# Patient Record
Sex: FEMALE | Race: WHITE | Employment: FULL TIME | URBAN - METROPOLITAN AREA
[De-identification: names, ages, dates, MRNs, and addresses within clinical notes are randomized per-mention and may not be internally consistent; named-entity substitution may affect disease eponyms.]

---

## 2017-04-14 ENCOUNTER — TELEPHONE (OUTPATIENT)
Dept: FAMILY MEDICINE CLINIC | Facility: CLINIC | Age: 67
End: 2017-04-14

## 2017-04-14 NOTE — TELEPHONE ENCOUNTER
LM to call back to complete the Annual Health Assessment form prior to her appt.  On 4/20/17 at 11:00 am with Dr. Rosaura Ortiz

## 2017-04-20 ENCOUNTER — OFFICE VISIT (OUTPATIENT)
Dept: FAMILY MEDICINE CLINIC | Facility: CLINIC | Age: 67
End: 2017-04-20

## 2017-04-20 DIAGNOSIS — Z12.31 VISIT FOR SCREENING MAMMOGRAM: ICD-10-CM

## 2017-04-20 DIAGNOSIS — Z00.00 ENCOUNTER FOR ANNUAL HEALTH EXAMINATION: Primary | ICD-10-CM

## 2017-04-20 DIAGNOSIS — M54.50 CHRONIC BILATERAL LOW BACK PAIN WITHOUT SCIATICA: ICD-10-CM

## 2017-04-20 DIAGNOSIS — B07.0 PLANTAR WART: ICD-10-CM

## 2017-04-20 DIAGNOSIS — G89.29 CHRONIC BILATERAL LOW BACK PAIN WITHOUT SCIATICA: ICD-10-CM

## 2017-04-20 DIAGNOSIS — Z12.11 SCREENING FOR COLON CANCER: ICD-10-CM

## 2017-04-20 DIAGNOSIS — Z78.0 POSTMENOPAUSAL: ICD-10-CM

## 2017-04-20 DIAGNOSIS — B35.1 NAIL FUNGAL INFECTION: ICD-10-CM

## 2017-04-20 DIAGNOSIS — Z13.220 SCREENING FOR LIPOID DISORDERS: ICD-10-CM

## 2017-04-20 DIAGNOSIS — Z13.0 SCREENING FOR DEFICIENCY ANEMIA: ICD-10-CM

## 2017-04-20 DIAGNOSIS — I10 ESSENTIAL HYPERTENSION: ICD-10-CM

## 2017-04-20 DIAGNOSIS — Z13.29 SCREENING FOR THYROID DISORDER: ICD-10-CM

## 2017-04-20 DIAGNOSIS — R42 VERTIGO: ICD-10-CM

## 2017-04-20 DIAGNOSIS — Z00.00 LABORATORY EXAM ORDERED AS PART OF ROUTINE GENERAL MEDICAL EXAMINATION: ICD-10-CM

## 2017-04-20 PROCEDURE — 99203 OFFICE O/P NEW LOW 30 MIN: CPT | Performed by: FAMILY MEDICINE

## 2017-04-20 PROCEDURE — G0438 PPPS, INITIAL VISIT: HCPCS | Performed by: FAMILY MEDICINE

## 2017-04-20 RX ORDER — MULTIVIT,IRON,MINERALS/LUTEIN
1 TABLET ORAL DAILY
COMMUNITY

## 2017-04-20 RX ORDER — MULTIVIT WITH MINERALS/LUTEIN
250 TABLET ORAL DAILY
COMMUNITY

## 2017-04-20 RX ORDER — CALCIUM CARBONATE/VITAMIN D3 600 MG-10
1 TABLET ORAL DAILY
COMMUNITY

## 2017-04-20 RX ORDER — LISINOPRIL 10 MG/1
10 TABLET ORAL
Refills: 3 | COMMUNITY
Start: 2017-04-07 | End: 2017-05-18 | Stop reason: DRUGHIGH

## 2017-04-20 NOTE — PATIENT INSTRUCTIONS
Taty Alcaraz's SCREENING SCHEDULE   Tests on this list are recommended by your physician but may not be covered, or covered at this frequency, by your insurer. Please check with your insurance carrier before scheduling to verify coverage.    PREVENTATI Covered every 10 years- more often if abnormal Colonoscopy,10 Years due on 08/04/1998 Update Health Maintenance if applicable    Flex Sigmoidoscopy Screen  Covered every 5 years No results found for this or any previous visit. No flowsheet data found. (Pneumovax)  Covered Once after 65 No orders found for this or any previous visit. Please get once after your 65th birthday    Hepatitis B for Moderate/High Risk       No orders found for this or any previous visit.  Medium/high risk factors:   End-stage re

## 2017-04-20 NOTE — PROGRESS NOTES
HPI:   Marito Coelho is a 74year old female who presents for a Medicare Initial Annual Wellness visit (Once after 12 month Medicare anniversery)  also needs med reviewed/refill and forms completed to have work restrictions.      LOV 5/2012    Hx of chr Lab Results  Component Value Date   AST 16 11/03/2011   ALT 13 11/03/2011   CA 9.5 11/03/2011   ALB 4.2 11/03/2011   TSH 0.732 12/16/2008   CREATSERUM 0.73 11/03/2011   * 11/03/2011        CBC  (most recent labs)     Lab Results  Component Value Hypertension in her mother; acute MI in her father; hodgkin's disease in her father. There is no history of Heart Disorder, Lipids, Obesity, or Psychiatric. SOCIAL HISTORY:   She  reports that she has never smoked.  She has never used smokeless tobacco. S normal, no murmur, click, rub or gallop, regular rate and rhythm  Extremities: extremities normal, atraumatic, no cyanosis or edema    SUGGESTED VACCINATIONS - Influenza, Pneumococcal, Zoster, Tetanus     Immunization History   Administered Date(s) Adminis does not have a Living Will on file in [address] [place] [address] Rd. Discussed with patient and provided information      [address] [place] on file in Epic:    Hugo Goddard does not have a Power of  for [employer] on file in [address] [place] [address] Rd.  Discussed with patient and any tripping hazards?: 0-No    Are you on multiple medications?: 0-No    Does pain affect your day to day activities?: 1-Yes     Have you had any memory issues?: 0-No    Fall/Risk Scoring: 3    Scoring Interpretation: 0 - 3 No Risk     Depression Screening Screening      Ophthalmology Visit Annually: Diabetics, FHx Glaucoma, AA>50, > 65 No flowsheet data found. Bone Density Screening      Dexascan Every two years No results found for this or any previous visit. No flowsheet data found.     Pap and Potassium  Annually POTASSIUM (mmol/L)   Date Value   11/03/2011 5.3   12/16/2008 4.5    No flowsheet data found.     Creatinine  Annually CREATININE, SERUM (mg/dL)   Date Value   12/16/2008 0.70     CREATININE (mg/dL)   Date Value   11/03/2011 0.73    No f

## 2017-04-22 VITALS
HEIGHT: 64.8 IN | TEMPERATURE: 98 F | DIASTOLIC BLOOD PRESSURE: 88 MMHG | HEART RATE: 80 BPM | SYSTOLIC BLOOD PRESSURE: 138 MMHG | RESPIRATION RATE: 16 BRPM | WEIGHT: 189.38 LBS | BODY MASS INDEX: 31.55 KG/M2

## 2017-04-22 PROBLEM — R42 VERTIGO: Status: ACTIVE | Noted: 2017-04-22

## 2017-04-22 PROBLEM — I10 ESSENTIAL HYPERTENSION: Status: ACTIVE | Noted: 2017-04-22

## 2017-04-22 PROBLEM — G89.29 CHRONIC BILATERAL LOW BACK PAIN WITHOUT SCIATICA: Status: ACTIVE | Noted: 2017-04-22

## 2017-04-22 PROBLEM — M54.50 CHRONIC BILATERAL LOW BACK PAIN WITHOUT SCIATICA: Status: ACTIVE | Noted: 2017-04-22

## 2017-04-27 ENCOUNTER — LAB ENCOUNTER (OUTPATIENT)
Dept: LAB | Age: 67
End: 2017-04-27
Attending: FAMILY MEDICINE
Payer: MEDICARE

## 2017-04-27 DIAGNOSIS — Z13.0 SCREENING FOR DEFICIENCY ANEMIA: ICD-10-CM

## 2017-04-27 DIAGNOSIS — Z13.29 SCREENING FOR THYROID DISORDER: ICD-10-CM

## 2017-04-27 DIAGNOSIS — Z00.00 LABORATORY EXAM ORDERED AS PART OF ROUTINE GENERAL MEDICAL EXAMINATION: ICD-10-CM

## 2017-04-27 DIAGNOSIS — Z13.220 SCREENING FOR LIPOID DISORDERS: ICD-10-CM

## 2017-04-27 PROCEDURE — 84443 ASSAY THYROID STIM HORMONE: CPT

## 2017-04-27 PROCEDURE — 84439 ASSAY OF FREE THYROXINE: CPT

## 2017-04-27 PROCEDURE — 80061 LIPID PANEL: CPT

## 2017-04-27 PROCEDURE — 85025 COMPLETE CBC W/AUTO DIFF WBC: CPT

## 2017-04-27 PROCEDURE — 36415 COLL VENOUS BLD VENIPUNCTURE: CPT

## 2017-04-27 PROCEDURE — 80053 COMPREHEN METABOLIC PANEL: CPT

## 2017-05-11 ENCOUNTER — OFFICE VISIT (OUTPATIENT)
Dept: FAMILY MEDICINE CLINIC | Facility: CLINIC | Age: 67
End: 2017-05-11

## 2017-05-11 ENCOUNTER — APPOINTMENT (OUTPATIENT)
Dept: LAB | Age: 67
End: 2017-05-11
Attending: FAMILY MEDICINE
Payer: MEDICARE

## 2017-05-11 VITALS
SYSTOLIC BLOOD PRESSURE: 156 MMHG | TEMPERATURE: 99 F | DIASTOLIC BLOOD PRESSURE: 106 MMHG | BODY MASS INDEX: 32 KG/M2 | RESPIRATION RATE: 16 BRPM | WEIGHT: 191 LBS | HEART RATE: 84 BPM

## 2017-05-11 DIAGNOSIS — R53.83 FATIGUE, UNSPECIFIED TYPE: ICD-10-CM

## 2017-05-11 DIAGNOSIS — I10 ESSENTIAL HYPERTENSION: Primary | ICD-10-CM

## 2017-05-11 DIAGNOSIS — R60.0 PEDAL EDEMA: ICD-10-CM

## 2017-05-11 PROCEDURE — 36415 COLL VENOUS BLD VENIPUNCTURE: CPT

## 2017-05-11 PROCEDURE — 82746 ASSAY OF FOLIC ACID SERUM: CPT

## 2017-05-11 PROCEDURE — 82607 VITAMIN B-12: CPT

## 2017-05-11 PROCEDURE — 99214 OFFICE O/P EST MOD 30 MIN: CPT | Performed by: FAMILY MEDICINE

## 2017-05-11 PROCEDURE — 82306 VITAMIN D 25 HYDROXY: CPT

## 2017-05-11 RX ORDER — LISINOPRIL AND HYDROCHLOROTHIAZIDE 20; 12.5 MG/1; MG/1
1 TABLET ORAL DAILY
Qty: 30 TABLET | Refills: 5 | Status: SHIPPED | OUTPATIENT
Start: 2017-05-11 | End: 2017-10-22

## 2017-05-11 NOTE — PROGRESS NOTES
Amee Hunter is a 74year old female. HPI:   Has been checking BP at home - 150/90. Reviewed labs, has elevated mcv. Otherwise normal.   C/o fatigue. Has low pedal edema that is chronic.     Patient Active Problem List:     Chronic bilateral l

## 2017-05-13 NOTE — PROGRESS NOTES
Quick Note:    Labs reviewed, will discuss at upcoming office visit. Elevated folic acid and Y05 - will check on supplements.   Vit D acceptable.  ______

## 2017-05-18 ENCOUNTER — OFFICE VISIT (OUTPATIENT)
Dept: FAMILY MEDICINE CLINIC | Facility: CLINIC | Age: 67
End: 2017-05-18

## 2017-05-18 VITALS
HEIGHT: 65.5 IN | BODY MASS INDEX: 31.44 KG/M2 | DIASTOLIC BLOOD PRESSURE: 88 MMHG | HEART RATE: 84 BPM | TEMPERATURE: 99 F | WEIGHT: 191 LBS | RESPIRATION RATE: 16 BRPM | SYSTOLIC BLOOD PRESSURE: 136 MMHG

## 2017-05-18 DIAGNOSIS — I10 ESSENTIAL HYPERTENSION: Primary | ICD-10-CM

## 2017-05-18 DIAGNOSIS — R53.83 FATIGUE, UNSPECIFIED TYPE: ICD-10-CM

## 2017-05-18 PROCEDURE — 99213 OFFICE O/P EST LOW 20 MIN: CPT | Performed by: FAMILY MEDICINE

## 2017-05-18 NOTE — PROGRESS NOTES
Nancy Camacho is a 74year old female. HPI:   Med changed to lisinopril-HCTZ 1 week ago. BP better. Home readings 130/80s. Had labs done 2/2 fatigue. S90 high/normal  Folic acid high. Vit D 40.    Pt not exactly sure which supplements she is

## 2017-07-07 ENCOUNTER — APPOINTMENT (OUTPATIENT)
Dept: GENERAL RADIOLOGY | Facility: HOSPITAL | Age: 67
End: 2017-07-07
Attending: EMERGENCY MEDICINE
Payer: MEDICARE

## 2017-07-07 ENCOUNTER — APPOINTMENT (OUTPATIENT)
Dept: ULTRASOUND IMAGING | Facility: HOSPITAL | Age: 67
End: 2017-07-07
Attending: EMERGENCY MEDICINE
Payer: MEDICARE

## 2017-07-07 ENCOUNTER — HOSPITAL ENCOUNTER (EMERGENCY)
Facility: HOSPITAL | Age: 67
Discharge: HOME | End: 2017-07-07
Attending: EMERGENCY MEDICINE
Payer: MEDICARE

## 2017-07-07 VITALS
SYSTOLIC BLOOD PRESSURE: 147 MMHG | TEMPERATURE: 98 F | OXYGEN SATURATION: 95 % | BODY MASS INDEX: 30.69 KG/M2 | HEIGHT: 66 IN | WEIGHT: 190.94 LBS | DIASTOLIC BLOOD PRESSURE: 75 MMHG | HEART RATE: 63 BPM | RESPIRATION RATE: 17 BRPM

## 2017-07-07 DIAGNOSIS — M79.672 PAIN OF LEFT HEEL: Primary | ICD-10-CM

## 2017-07-07 DIAGNOSIS — R60.0 LEG EDEMA, LEFT: ICD-10-CM

## 2017-07-07 PROCEDURE — 99284 EMERGENCY DEPT VISIT MOD MDM: CPT

## 2017-07-07 PROCEDURE — 73630 X-RAY EXAM OF FOOT: CPT | Performed by: EMERGENCY MEDICINE

## 2017-07-07 PROCEDURE — 93971 EXTREMITY STUDY: CPT | Performed by: EMERGENCY MEDICINE

## 2017-07-07 NOTE — ED PROVIDER NOTES
Patient Seen in: [facility] Emergency Department    History   Patient presents with:  Heel Pain    Stated Complaint: heal pain    HPI    This is a very pleasant 69-year-old female who presents with left foot and heel pain with foot swelling.   Amrit arthro    Medications :   Lisinopril-Hydrochlorothiazide 20-12.5 MG Oral Tab,  Take 1 tablet by mouth daily. Multiple Vitamins-Minerals (CENTRUM SILVER ULTRA WOMENS) Oral Tab,  Take 1 tablet by mouth daily.    vitamin A 58312 UNITS Oral Cap,  Take 10,000 98.2 °F (36.8 °C) (Oral)   Resp 17   Ht 167.6 cm (5' 6\")   Wt 86.6 kg   SpO2 95%   BMI 30.81 kg/m²         Physical Exam    GENERAL: Awake, alert oriented x3, nontoxic appearing. SKIN: Normal, warm, and dry.   HEENT:  Pupils equally round and reactive to The following veins were imaged:  Common, deep, and superficial femoral, popliteal, sapheno-femoral junction, posterior tibial veins, and the contralateral common femoral vein.   FINDINGS:  EXTREMITY EXAMINED:  Left lower extremity SAPHENOFEMORAL JUNCTION:

## 2017-07-07 NOTE — ED INITIAL ASSESSMENT (HPI)
Patient presents with complaints of pain and swelling in the left foot. This first started a while back when she noticed some heel pain. Since it has progressed to swelling and some numbness on the lateral aspect of her foot.  No known injury, but she spend

## 2017-07-10 ENCOUNTER — OFFICE VISIT (OUTPATIENT)
Dept: FAMILY MEDICINE CLINIC | Facility: CLINIC | Age: 67
End: 2017-07-10

## 2017-07-10 VITALS
RESPIRATION RATE: 18 BRPM | WEIGHT: 190.63 LBS | SYSTOLIC BLOOD PRESSURE: 130 MMHG | DIASTOLIC BLOOD PRESSURE: 78 MMHG | HEIGHT: 64.5 IN | HEART RATE: 84 BPM | BODY MASS INDEX: 32.15 KG/M2

## 2017-07-10 DIAGNOSIS — M79.89 FOOT SWELLING: Primary | ICD-10-CM

## 2017-07-10 DIAGNOSIS — M79.671 RIGHT FOOT PAIN: ICD-10-CM

## 2017-07-10 PROCEDURE — 99213 OFFICE O/P EST LOW 20 MIN: CPT | Performed by: FAMILY MEDICINE

## 2017-07-10 NOTE — PROGRESS NOTES
Lainey Santos is a 74year old female. S:  Patient presents today with the following concerns:  · Pain in left heel since April or May, then traveled to big toe and now pain is on the top of the foot. Hurts to walk and put weight on the foot.   Chris Cancer Daughter      Acute Lymphcytic Leukemia   • Cancer Brother      Colon   • Heart Disorder Neg    • Lipids Neg    • Obesity Neg    • Psychiatric Neg    • acute MI [Other] [OTHER] Father    • hodgkin's disease [Other] [OTHER] Father        REVIEW OF SY

## 2017-07-18 PROBLEM — M84.375A STRESS REACTION OF LEFT FOOT, INITIAL ENCOUNTER: Status: ACTIVE | Noted: 2017-07-18

## 2017-07-28 PROBLEM — M84.375D STRESS FRACTURE OF LEFT FOOT WITH ROUTINE HEALING, SUBSEQUENT ENCOUNTER: Status: ACTIVE | Noted: 2017-07-28

## 2017-10-23 RX ORDER — LISINOPRIL AND HYDROCHLOROTHIAZIDE 20; 12.5 MG/1; MG/1
1 TABLET ORAL DAILY
Qty: 30 TABLET | Refills: 5 | Status: SHIPPED | OUTPATIENT
Start: 2017-10-23 | End: 2017-11-09

## 2017-11-09 ENCOUNTER — OFFICE VISIT (OUTPATIENT)
Dept: FAMILY MEDICINE CLINIC | Facility: CLINIC | Age: 67
End: 2017-11-09

## 2017-11-09 VITALS
BODY MASS INDEX: 30.53 KG/M2 | HEART RATE: 72 BPM | WEIGHT: 190 LBS | SYSTOLIC BLOOD PRESSURE: 118 MMHG | TEMPERATURE: 98 F | RESPIRATION RATE: 16 BRPM | HEIGHT: 66 IN | DIASTOLIC BLOOD PRESSURE: 60 MMHG

## 2017-11-09 DIAGNOSIS — I10 ESSENTIAL HYPERTENSION: Primary | ICD-10-CM

## 2017-11-09 DIAGNOSIS — R42 VERTIGO: ICD-10-CM

## 2017-11-09 DIAGNOSIS — E78.00 PURE HYPERCHOLESTEROLEMIA: ICD-10-CM

## 2017-11-09 PROCEDURE — 99214 OFFICE O/P EST MOD 30 MIN: CPT | Performed by: FAMILY MEDICINE

## 2017-11-09 RX ORDER — INFLUENZA A VIRUSA/MICHIGAN/45/2015 X-275 (H1N1) ANTIGEN (FORMALDEHYDE INACTIVATED), INFLUENZA A VIRUS A/HONG KONG/4801/2014 X-263B (H3N2) ANTIGEN (FORMALDEHYDE INACTIVATED), AND INFLUENZA B VIRUS B/BRISBANE/60/2008 ANTIGEN (FORMALDEHYDE INACTIVATED) 60; 60; 60 UG/.5ML; UG/.5ML; UG/.5ML
INJECTION, SUSPENSION INTRAMUSCULAR
Refills: 0 | COMMUNITY
Start: 2017-10-06 | End: 2017-11-09

## 2017-11-09 RX ORDER — LISINOPRIL AND HYDROCHLOROTHIAZIDE 20; 12.5 MG/1; MG/1
1 TABLET ORAL DAILY
Qty: 90 TABLET | Refills: 1 | Status: SHIPPED | OUTPATIENT
Start: 2017-11-09 | End: 2018-05-17

## 2017-11-09 NOTE — PROGRESS NOTES
Logan Alexandre is a 69year old female. HPI:   Patient has a history of hypertension which is currently treated with lisinopril hydrochlorothiazide 20–12 0.5 mg daily. Blood pressure is well controlled at this time. She has no side effects.     She h Future    Follow-up in 6 months for annual wellness visit. Have labs done prior to visit. No orders of the defined types were placed in this encounter.     Meds & Refills for this Visit:    No prescriptions requested or ordered in this encounter     Arianne Brice

## 2018-03-02 ENCOUNTER — OFFICE VISIT (OUTPATIENT)
Dept: FAMILY MEDICINE CLINIC | Facility: CLINIC | Age: 68
End: 2018-03-02

## 2018-03-02 VITALS
SYSTOLIC BLOOD PRESSURE: 122 MMHG | HEIGHT: 66 IN | TEMPERATURE: 98 F | WEIGHT: 189 LBS | RESPIRATION RATE: 16 BRPM | HEART RATE: 84 BPM | BODY MASS INDEX: 30.37 KG/M2 | DIASTOLIC BLOOD PRESSURE: 70 MMHG

## 2018-03-02 DIAGNOSIS — R04.0 EPISTAXIS: ICD-10-CM

## 2018-03-02 DIAGNOSIS — J01.40 ACUTE NON-RECURRENT PANSINUSITIS: Primary | ICD-10-CM

## 2018-03-02 PROCEDURE — 99213 OFFICE O/P EST LOW 20 MIN: CPT | Performed by: FAMILY MEDICINE

## 2018-03-02 RX ORDER — AMOXICILLIN 875 MG/1
875 TABLET, COATED ORAL 2 TIMES DAILY
Qty: 20 TABLET | Refills: 0 | Status: SHIPPED | OUTPATIENT
Start: 2018-03-02 | End: 2018-05-17

## 2018-03-02 NOTE — PROGRESS NOTES
HPI:   Fco Randall is a 69year old female who presents for upper respiratory symptoms. URI sx began about 11 days ago. 1 week ago, had large nose bleed that lasted 40 min, had clots. Had a couple more, but then improved last week.   Then had sev tenderness  NECK: supple,no adenopathy  LUNGS: clear to auscultation  CARDIO: RRR without murmur    ASSESSMENT AND PLAN:   Logan Alexandre is a 69year old female.   Acute non-recurrent pansinusitis  (primary encounter diagnosis)  Epistaxis  · abx as direct

## 2018-04-20 ENCOUNTER — TELEPHONE (OUTPATIENT)
Dept: FAMILY MEDICINE CLINIC | Facility: CLINIC | Age: 68
End: 2018-04-20

## 2018-05-11 ENCOUNTER — TELEPHONE (OUTPATIENT)
Dept: FAMILY MEDICINE CLINIC | Facility: CLINIC | Age: 68
End: 2018-05-11

## 2018-05-11 NOTE — TELEPHONE ENCOUNTER
Called patient and left message asking patient to contact the office to complete annual health assessment prior to appointment 05/17/18

## 2018-05-17 ENCOUNTER — OFFICE VISIT (OUTPATIENT)
Dept: FAMILY MEDICINE CLINIC | Facility: CLINIC | Age: 68
End: 2018-05-17

## 2018-05-17 ENCOUNTER — LAB ENCOUNTER (OUTPATIENT)
Dept: LAB | Age: 68
End: 2018-05-17
Attending: FAMILY MEDICINE
Payer: MEDICARE

## 2018-05-17 VITALS
SYSTOLIC BLOOD PRESSURE: 120 MMHG | HEART RATE: 84 BPM | RESPIRATION RATE: 16 BRPM | TEMPERATURE: 98 F | DIASTOLIC BLOOD PRESSURE: 80 MMHG | WEIGHT: 190 LBS | HEIGHT: 64 IN | BODY MASS INDEX: 32.44 KG/M2

## 2018-05-17 DIAGNOSIS — Z11.59 NEED FOR HEPATITIS C SCREENING TEST: ICD-10-CM

## 2018-05-17 DIAGNOSIS — E78.00 PURE HYPERCHOLESTEROLEMIA: ICD-10-CM

## 2018-05-17 DIAGNOSIS — Z12.31 VISIT FOR SCREENING MAMMOGRAM: ICD-10-CM

## 2018-05-17 DIAGNOSIS — Z00.00 ENCOUNTER FOR ANNUAL HEALTH EXAMINATION: Primary | ICD-10-CM

## 2018-05-17 DIAGNOSIS — Z23 NEED FOR VACCINATION: ICD-10-CM

## 2018-05-17 DIAGNOSIS — Z78.0 POSTMENOPAUSAL ESTROGEN DEFICIENCY: ICD-10-CM

## 2018-05-17 DIAGNOSIS — I10 ESSENTIAL HYPERTENSION: ICD-10-CM

## 2018-05-17 DIAGNOSIS — Z12.11 COLON CANCER SCREENING: ICD-10-CM

## 2018-05-17 PROCEDURE — G0009 ADMIN PNEUMOCOCCAL VACCINE: HCPCS | Performed by: FAMILY MEDICINE

## 2018-05-17 PROCEDURE — G0439 PPPS, SUBSEQ VISIT: HCPCS | Performed by: FAMILY MEDICINE

## 2018-05-17 PROCEDURE — 80061 LIPID PANEL: CPT

## 2018-05-17 PROCEDURE — 80053 COMPREHEN METABOLIC PANEL: CPT

## 2018-05-17 PROCEDURE — 90670 PCV13 VACCINE IM: CPT | Performed by: FAMILY MEDICINE

## 2018-05-17 PROCEDURE — 86803 HEPATITIS C AB TEST: CPT

## 2018-05-17 RX ORDER — LISINOPRIL AND HYDROCHLOROTHIAZIDE 20; 12.5 MG/1; MG/1
1 TABLET ORAL DAILY
Qty: 90 TABLET | Refills: 3 | Status: SHIPPED | OUTPATIENT
Start: 2018-05-17 | End: 2019-06-04

## 2018-05-17 NOTE — PATIENT INSTRUCTIONS
Janak Alcaraz's SCREENING SCHEDULE   Tests on this list are recommended by your physician but may not be covered, or covered at this frequency, by your insurer. Please check with your insurance carrier before scheduling to verify coverage.    PREVENTATI patients who meet one of the following criteria:   • Men who are 73-68 years old and have smoked more than 100 cigarettes in their lifetime   • Anyone with a family history    Colorectal Cancer Screening  Covered up to Age 76     Colonoscopy Screen   Cover Annually No orders found for this or any previous visit.  Please get every year    Pneumococcal 13 (Prevnar)  Covered Once after 65   Orders placed or performed in visit on 05/17/18  -PNEUMOCOCCAL VACC, 13 DOREEN IM    Please get once after your 65th birthday

## 2018-05-21 NOTE — PROGRESS NOTES
Discussed test results with Fiona Huertas, by phone, giving her 's comments. Patient verbalizes understanding.

## 2018-09-16 NOTE — PROGRESS NOTES
HPI:   Yoanna Momin is a 69year old female who presents for a Medicare Subsequent Annual Wellness visit (Pt already had Initial Annual Wellness). Hx of chronic low back pain. Pt has hx of two back surgeries at [facility].  Taking 2 aleve in AM, and Family/surrogate (if present), and forms available to patient in AVS       She does NOT have a Power of  for [employer] on file in [place].    Advance care planning including the explanation and discussion of advance directives standard forms perf 3 by mouth daily. Calcium Carbonate-Vitamin D 600-400 MG-UNIT Oral Tab Take 1 tablet by mouth daily. Misc Natural Products (OSTEO BI-FLEX TRIPLE STRENGTH) Oral Tab Take 1 tablet by mouth daily.    Multiple Vitamins-Minerals (ANTIOXIDANT) Oral Tab Take 1 t kg/m²  Estimated body mass index is 32.61 kg/m² as calculated from the following:    Height as of this encounter: 64\". Weight as of this encounter: 190 lb.     Medicare Hearing Assessment  (Required for AWV/SWV)    Hearing Screening    Time taken:  5/17 controlled. CPM       Diet assessment: good     PLAN:  The patient indicates understanding of these issues and agrees to the plan. Reinforced healthy diet, lifestyle, and exercise. Return in 1 year (on 5/17/2019).      Margarito Gonzalez MD, 5/17/2018 visit. No flowsheet data found. Pap and Pelvic      Pap: Every 3 yrs age 21-68 or Pap+HPV every 5 yrs age 33-67, age 72 and older at high risk There are no preventive care reminders to display for this patient.  Update [employer] if applicable (mg/dL)   Date Value   12/16/2008 0.70     CREATININE (mg/dL)   Date Value   11/03/2011 0.73     Creatinine (mg/dL)   Date Value   04/27/2017 0.61    No flowsheet data found.     Drug Serum Conc  Annually No results found for: DIGOXIN, DIG, VALP No flowshee

## 2018-09-18 ENCOUNTER — HOSPITAL ENCOUNTER (EMERGENCY)
Facility: HOSPITAL | Age: 68
Discharge: HOME | End: 2018-09-18
Attending: EMERGENCY MEDICINE
Payer: MEDICARE

## 2018-09-18 ENCOUNTER — APPOINTMENT (OUTPATIENT)
Dept: GENERAL RADIOLOGY | Facility: HOSPITAL | Age: 68
End: 2018-09-18
Attending: EMERGENCY MEDICINE
Payer: MEDICARE

## 2018-09-18 VITALS
HEART RATE: 68 BPM | TEMPERATURE: 98 F | OXYGEN SATURATION: 98 % | DIASTOLIC BLOOD PRESSURE: 62 MMHG | SYSTOLIC BLOOD PRESSURE: 156 MMHG | RESPIRATION RATE: 18 BRPM | BODY MASS INDEX: 32.45 KG/M2 | WEIGHT: 190.06 LBS | HEIGHT: 64.02 IN

## 2018-09-18 DIAGNOSIS — M71.122 SEPTIC OLECRANON BURSITIS OF LEFT ELBOW: Primary | ICD-10-CM

## 2018-09-18 PROCEDURE — 20605 DRAIN/INJ JOINT/BURSA W/O US: CPT

## 2018-09-18 PROCEDURE — 87147 CULTURE TYPE IMMUNOLOGIC: CPT | Performed by: EMERGENCY MEDICINE

## 2018-09-18 PROCEDURE — 99284 EMERGENCY DEPT VISIT MOD MDM: CPT

## 2018-09-18 PROCEDURE — S0077 INJECTION, CLINDAMYCIN PHOSP: HCPCS | Performed by: EMERGENCY MEDICINE

## 2018-09-18 PROCEDURE — 87205 SMEAR GRAM STAIN: CPT | Performed by: EMERGENCY MEDICINE

## 2018-09-18 PROCEDURE — 10060 I&D ABSCESS SIMPLE/SINGLE: CPT

## 2018-09-18 PROCEDURE — 73080 X-RAY EXAM OF ELBOW: CPT | Performed by: EMERGENCY MEDICINE

## 2018-09-18 PROCEDURE — 87186 SC STD MICRODIL/AGAR DIL: CPT | Performed by: EMERGENCY MEDICINE

## 2018-09-18 PROCEDURE — 87070 CULTURE OTHR SPECIMN AEROBIC: CPT | Performed by: EMERGENCY MEDICINE

## 2018-09-18 PROCEDURE — 96365 THER/PROPH/DIAG IV INF INIT: CPT

## 2018-09-18 RX ORDER — CLINDAMYCIN PHOSPHATE 600 MG/50ML
600 INJECTION INTRAVENOUS ONCE
Status: COMPLETED | OUTPATIENT
Start: 2018-09-18 | End: 2018-09-18

## 2018-09-18 RX ORDER — MORPHINE SULFATE 4 MG/ML
4 INJECTION, SOLUTION INTRAMUSCULAR; INTRAVENOUS EVERY 30 MIN PRN
Status: DISCONTINUED | OUTPATIENT
Start: 2018-09-18 | End: 2018-09-18

## 2018-09-18 RX ORDER — LIDOCAINE HYDROCHLORIDE AND EPINEPHRINE 15; 5 MG/ML; UG/ML
INJECTION, SOLUTION EPIDURAL
Status: COMPLETED
Start: 2018-09-18 | End: 2018-09-18

## 2018-09-18 RX ORDER — ONDANSETRON 2 MG/ML
4 INJECTION INTRAMUSCULAR; INTRAVENOUS
Status: DISCONTINUED | OUTPATIENT
Start: 2018-09-18 | End: 2018-09-18

## 2018-09-18 RX ORDER — CLINDAMYCIN HYDROCHLORIDE 300 MG/1
300 CAPSULE ORAL 4 TIMES DAILY
Qty: 28 CAPSULE | Refills: 0 | Status: SHIPPED | OUTPATIENT
Start: 2018-09-18 | End: 2018-09-25

## 2018-09-18 RX ORDER — LIDOCAINE HYDROCHLORIDE AND EPINEPHRINE 5; 5 MG/ML; UG/ML
1 INJECTION, SOLUTION INFILTRATION; PERINEURAL ONCE
Status: DISCONTINUED | OUTPATIENT
Start: 2018-09-18 | End: 2018-09-18

## 2018-09-18 NOTE — ED PROVIDER NOTES
Patient Seen in: [facility] Emergency Department    History   Patient presents with:  Upper Extremity Injury (musculoskeletal)    Stated Complaint: L elbow pain     HPI    Patient complains of pain in the left elbow.   Elbow pain started about 5 days oz      Types: 1 - 2 Cans of beer per week    Drug use: No      Review of Systems    Positive for stated complaint: L elbow pain   Other systems are as noted in HPI. Constitutional and vital signs reviewed.       All other systems reviewed and negative exc incision was made in this area, more cloudy fluid was expressed. The wound was gently probed with cotton tip applicator and a culture obtained. I recommend rest, elevation, cool compresses, Tylenol or Motrin for pain.   I will discharge home on clindamyc

## 2018-09-18 NOTE — ED INITIAL ASSESSMENT (HPI)
Pain, redness, swelling to left elbow x5 days. Pt states she felt shooting pain to elbow and it locked twice while lifting empty basket at work last Wednesday. Afebrile.

## 2018-09-18 NOTE — ED NOTES
----- Message from Mare Reed sent at 9/18/2018  8:48 AM CDT -----  Contact: Pt.Self   Pt. States she would like to speak with nurse in reference to having swelling tightening in both legs needs to know what to do please call Pt. Back @ [phone] Thank You    Xray at bedside

## 2018-10-11 PROBLEM — M70.22 OLECRANON BURSITIS OF LEFT ELBOW: Status: ACTIVE | Noted: 2018-10-11

## 2019-03-19 ENCOUNTER — TELEPHONE (OUTPATIENT)
Dept: FAMILY MEDICINE CLINIC | Facility: CLINIC | Age: 69
End: 2019-03-19

## 2019-03-19 NOTE — TELEPHONE ENCOUNTER
Called patient and left message on machine, due in for medicare annual well visit anytime after 05/01/19, asked that she contact the office to schedule

## 2019-06-04 NOTE — TELEPHONE ENCOUNTER
LOV 5/17/2018    Overdue for appointment    Routed to front staff for appointment scheduling. Please route back to triage once patient has scheduled an appointment.  Please ask patient whether they have enough medication to last until their scheduled appo

## 2019-06-07 RX ORDER — LISINOPRIL AND HYDROCHLOROTHIAZIDE 20; 12.5 MG/1; MG/1
TABLET ORAL
Qty: 90 TABLET | Refills: 0 | Status: SHIPPED | OUTPATIENT
Start: 2019-06-07 | End: 2019-07-11

## 2019-07-05 ENCOUNTER — TELEPHONE (OUTPATIENT)
Dept: FAMILY MEDICINE CLINIC | Facility: CLINIC | Age: 69
End: 2019-07-05

## 2019-07-11 ENCOUNTER — LAB ENCOUNTER (OUTPATIENT)
Dept: LAB | Age: 69
End: 2019-07-11
Attending: FAMILY MEDICINE
Payer: MEDICARE

## 2019-07-11 ENCOUNTER — OFFICE VISIT (OUTPATIENT)
Dept: FAMILY MEDICINE CLINIC | Facility: CLINIC | Age: 69
End: 2019-07-11
Payer: MEDICARE

## 2019-07-11 VITALS
HEIGHT: 64 IN | WEIGHT: 187.19 LBS | SYSTOLIC BLOOD PRESSURE: 132 MMHG | BODY MASS INDEX: 31.96 KG/M2 | DIASTOLIC BLOOD PRESSURE: 88 MMHG | HEART RATE: 76 BPM | TEMPERATURE: 98 F

## 2019-07-11 DIAGNOSIS — Z12.31 VISIT FOR SCREENING MAMMOGRAM: ICD-10-CM

## 2019-07-11 DIAGNOSIS — Z00.00 LABORATORY EXAMINATION ORDERED AS PART OF A ROUTINE GENERAL MEDICAL EXAMINATION: ICD-10-CM

## 2019-07-11 DIAGNOSIS — Z00.00 ENCOUNTER FOR ANNUAL HEALTH EXAMINATION: Primary | ICD-10-CM

## 2019-07-11 DIAGNOSIS — R42 VERTIGO: ICD-10-CM

## 2019-07-11 DIAGNOSIS — Z23 NEED FOR VACCINATION: ICD-10-CM

## 2019-07-11 DIAGNOSIS — I10 ESSENTIAL HYPERTENSION: ICD-10-CM

## 2019-07-11 DIAGNOSIS — Z12.11 SCREENING FOR COLON CANCER: ICD-10-CM

## 2019-07-11 LAB
ALBUMIN SERPL-MCNC: 3.8 G/DL (ref 3.4–5)
ALBUMIN/GLOB SERPL: 1.2 {RATIO} (ref 1–2)
ALP LIVER SERPL-CCNC: 80 U/L (ref 55–142)
ALT SERPL-CCNC: 21 U/L (ref 13–56)
ANION GAP SERPL CALC-SCNC: 3 MMOL/L (ref 0–18)
AST SERPL-CCNC: 20 U/L (ref 15–37)
BILIRUB SERPL-MCNC: 0.4 MG/DL (ref 0.1–2)
BUN BLD-MCNC: 25 MG/DL (ref 7–18)
BUN/CREAT SERPL: 32.9 (ref 10–20)
CALCIUM BLD-MCNC: 10.1 MG/DL (ref 8.5–10.1)
CHLORIDE SERPL-SCNC: 107 MMOL/L (ref 98–112)
CHOLEST SMN-MCNC: 266 MG/DL (ref ?–200)
CO2 SERPL-SCNC: 30 MMOL/L (ref 21–32)
CREAT BLD-MCNC: 0.76 MG/DL (ref 0.55–1.02)
GLOBULIN PLAS-MCNC: 3.3 G/DL (ref 2.8–4.4)
GLUCOSE BLD-MCNC: 88 MG/DL (ref 70–99)
HDLC SERPL-MCNC: 63 MG/DL (ref 40–59)
LDLC SERPL CALC-MCNC: 161 MG/DL (ref ?–100)
M PROTEIN MFR SERPL ELPH: 7.1 G/DL (ref 6.4–8.2)
NONHDLC SERPL-MCNC: 203 MG/DL (ref ?–130)
OSMOLALITY SERPL CALC.SUM OF ELEC: 294 MOSM/KG (ref 275–295)
POTASSIUM SERPL-SCNC: 5.2 MMOL/L (ref 3.5–5.1)
SODIUM SERPL-SCNC: 140 MMOL/L (ref 136–145)
TRIGL SERPL-MCNC: 208 MG/DL (ref 30–149)
VLDLC SERPL CALC-MCNC: 42 MG/DL (ref 0–30)

## 2019-07-11 PROCEDURE — 80061 LIPID PANEL: CPT

## 2019-07-11 PROCEDURE — 90732 PPSV23 VACC 2 YRS+ SUBQ/IM: CPT | Performed by: FAMILY MEDICINE

## 2019-07-11 PROCEDURE — 80053 COMPREHEN METABOLIC PANEL: CPT

## 2019-07-11 PROCEDURE — G0439 PPPS, SUBSEQ VISIT: HCPCS | Performed by: FAMILY MEDICINE

## 2019-07-11 PROCEDURE — G0009 ADMIN PNEUMOCOCCAL VACCINE: HCPCS | Performed by: FAMILY MEDICINE

## 2019-07-11 RX ORDER — MECLIZINE HYDROCHLORIDE 25 MG/1
TABLET ORAL
Qty: 180 TABLET | Refills: 1 | Status: SHIPPED | OUTPATIENT
Start: 2019-07-11 | End: 2020-09-24

## 2019-07-11 RX ORDER — LISINOPRIL AND HYDROCHLOROTHIAZIDE 20; 12.5 MG/1; MG/1
1 TABLET ORAL
Qty: 90 TABLET | Refills: 3 | Status: SHIPPED | OUTPATIENT
Start: 2019-07-11 | End: 2020-09-09

## 2019-07-11 NOTE — PATIENT INSTRUCTIONS
Johny Alcaraz's SCREENING SCHEDULE   Tests on this list are recommended by your physician but may not be covered, or covered at this frequency, by your insurer. Please check with your insurance carrier before scheduling to verify coverage.    PREVENTATI of the following criteria:   • Men who are 73-68 years old and have smoked more than 100 cigarettes in their lifetime   • Anyone with a family history    Colorectal Cancer Screening  Covered up to Age 76     Colonoscopy Screen   Covered every 10 years- mor this or any previous visit.  Please get every year    Pneumococcal 13 (Prevnar)  Covered Once after 65 Orders placed or performed in visit on 05/17/18   • PNEUMOCOCCAL VACC, 13 DOREEN IM    Please get once after your 65th birthday    Pneumococcal 23 (Pneumovax

## 2019-07-11 NOTE — PROGRESS NOTES
HPI:   Jose Hendrix is a 77year old female who presents for a Medicare Subsequent Annual Wellness visit (Pt already had Initial Annual Wellness). Works at [employer] in [place]. 32 hours per week but wants 40 hours. Stress with job.   Lives with [address])  Cecelia Reyes MD as PCP - [facility]    Patient Active Problem List:     Chronic bilateral low back pain without sciatica     Essential hypertension     Vertigo     Stress reaction of left foot, initial encounter     Stress fracture of left foot wi Cap Take 1 tablet by mouth daily. Cyanocobalamin (VITAMIN B 12 OR) Take 100 mcg by mouth daily.       MEDICAL INFORMATION:   She  has a past medical history of BACK PAIN, GERD, HEADACHES, HYPERLIPIDEMIA, HYPERTENSION, MITRAL VALVE PROLAPSE, and OTHER DISE this encounter: 64\". Weight as of this encounter: 187 lb 3.2 oz.     Medicare Hearing Assessment  (Required for AWV/SWV)    Hearing Screening    Screening Method:  Whisper Test  Whisper Test Result:  Pass            Visual Acuity  Right Eye Visual Acuit 10/01/2016        ASSESSMENT AND OTHER RELEVANT CHRONIC CONDITIONS:   Brook Oliver is a 77year old female who presents for a Medicare Assessment.      PLAN SUMMARY:   Diagnoses and all orders for this visit:    Encounter for annual health examination data found.     Fasting Blood Sugar (FSB)Annually Glucose (mg/dL)   Date Value   05/17/2018 91   12/16/2008 78     GLUCOSE (mg/dL)   Date Value   11/03/2011 101 (H)          Cardiovascular Disease Screening     LDL Annually LDL Cholesterol (mg/dL)   Date Va factors:   End-stage renal disease   Hemophiliacs who received Factor VIII or IX concentrates   Clients of institutions for the mentally retarded   Persons who live in the same house as a HepB virus carrier   Homosexual men   Illicit injectable drug abuser

## 2019-07-16 DIAGNOSIS — E87.5 SERUM POTASSIUM ELEVATED: ICD-10-CM

## 2019-07-16 DIAGNOSIS — E78.00 PURE HYPERCHOLESTEROLEMIA: Primary | ICD-10-CM

## 2019-07-16 RX ORDER — ATORVASTATIN CALCIUM 20 MG/1
20 TABLET, FILM COATED ORAL NIGHTLY
Qty: 90 TABLET | Refills: 0 | Status: SHIPPED | OUTPATIENT
Start: 2019-07-16 | End: 2019-10-11

## 2019-07-16 NOTE — PROGRESS NOTES
Results reviewed with patient per phone. Patient verbalized understanding. Rx for [facility] sent to [facility]. Lab orders entered

## 2019-07-23 PROCEDURE — 84132 ASSAY OF SERUM POTASSIUM: CPT | Performed by: FAMILY MEDICINE

## 2019-10-03 ENCOUNTER — HOSPITAL ENCOUNTER (OUTPATIENT)
Dept: MAMMOGRAPHY | Facility: HOSPITAL | Age: 69
Discharge: HOME | End: 2019-10-03
Attending: FAMILY MEDICINE
Payer: MEDICARE

## 2019-10-03 DIAGNOSIS — Z12.31 VISIT FOR SCREENING MAMMOGRAM: ICD-10-CM

## 2019-10-03 PROCEDURE — 77063 BREAST TOMOSYNTHESIS BI: CPT | Performed by: FAMILY MEDICINE

## 2019-10-03 PROCEDURE — 77067 SCR MAMMO BI INCL CAD: CPT | Performed by: FAMILY MEDICINE

## 2019-10-11 DIAGNOSIS — E78.00 PURE HYPERCHOLESTEROLEMIA: ICD-10-CM

## 2019-10-11 RX ORDER — ATORVASTATIN CALCIUM 20 MG/1
TABLET, FILM COATED ORAL
Qty: 90 TABLET | Refills: 0 | Status: SHIPPED | OUTPATIENT
Start: 2019-10-11 | End: 2020-01-07

## 2019-10-11 NOTE — TELEPHONE ENCOUNTER
Requested Prescriptions     Pending Prescriptions Disp Refills   • ATORVASTATIN 20 MG Oral Tab [Pharmacy Med Name: ATORVASTATIN 20 MG TABLET] 90 tablet 0     Sig: TAKE 1 TABLET BY MOUTH EVERY DAY AT NIGHT     LOV 7/11/2019   Lipid Panel completed 7/11/19

## 2019-10-24 ENCOUNTER — APPOINTMENT (OUTPATIENT)
Dept: LAB | Age: 69
End: 2019-10-24
Attending: FAMILY MEDICINE
Payer: MEDICARE

## 2019-10-24 DIAGNOSIS — E78.00 PURE HYPERCHOLESTEROLEMIA: ICD-10-CM

## 2019-10-24 PROCEDURE — 80061 LIPID PANEL: CPT

## 2019-11-11 ENCOUNTER — TELEPHONE (OUTPATIENT)
Dept: FAMILY MEDICINE CLINIC | Facility: CLINIC | Age: 69
End: 2019-11-11

## 2020-01-06 DIAGNOSIS — E78.00 PURE HYPERCHOLESTEROLEMIA: ICD-10-CM

## 2020-01-07 RX ORDER — ATORVASTATIN CALCIUM 20 MG/1
TABLET, FILM COATED ORAL
Qty: 90 TABLET | Refills: 0 | Status: SHIPPED | OUTPATIENT
Start: 2020-01-07 | End: 2020-03-23

## 2020-02-14 ENCOUNTER — TELEPHONE (OUTPATIENT)
Dept: FAMILY MEDICINE CLINIC | Facility: CLINIC | Age: 70
End: 2020-02-14

## 2020-02-14 NOTE — TELEPHONE ENCOUNTER
Has had pain for last month now movement limited in left shoulder and neck arm swollen hard to get dressed no HX of injury, has tried OTC oral pain meds with out relief.  Tried to marbellae for Monday but she needs to check her schedule to see if she can com

## 2020-02-17 ENCOUNTER — OFFICE VISIT (OUTPATIENT)
Dept: FAMILY MEDICINE CLINIC | Facility: CLINIC | Age: 70
End: 2020-02-17
Payer: MEDICARE

## 2020-02-17 VITALS
BODY MASS INDEX: 31.62 KG/M2 | HEART RATE: 74 BPM | HEIGHT: 64.57 IN | WEIGHT: 187.5 LBS | RESPIRATION RATE: 16 BRPM | TEMPERATURE: 98 F | DIASTOLIC BLOOD PRESSURE: 80 MMHG | SYSTOLIC BLOOD PRESSURE: 128 MMHG

## 2020-02-17 DIAGNOSIS — M25.512 ACUTE PAIN OF LEFT SHOULDER: Primary | ICD-10-CM

## 2020-02-17 PROCEDURE — 99213 OFFICE O/P EST LOW 20 MIN: CPT | Performed by: NURSE PRACTITIONER

## 2020-02-17 RX ORDER — CYCLOBENZAPRINE HCL 10 MG
10 TABLET ORAL NIGHTLY PRN
Qty: 10 TABLET | Refills: 0 | Status: SHIPPED | OUTPATIENT
Start: 2020-02-17 | End: 2020-03-02

## 2020-02-17 RX ORDER — NAPROXEN 500 MG/1
500 TABLET ORAL 2 TIMES DAILY WITH MEALS
Qty: 14 TABLET | Refills: 0 | Status: SHIPPED | OUTPATIENT
Start: 2020-02-17 | End: 2020-09-24

## 2020-02-17 NOTE — PROGRESS NOTES
Patient presents with:  Pain: x 1 month, is getting worse, left shoulder pain goes down to wrist      HPI:  Presents with approx 1 month history of left shoulder pain with occasional radiation down left arm.  Stated pain is a constant dull ache but becomes once daily. 90 tablet 3   • Meclizine HCl 25 MG Oral Tab po bid 180 tablet 1   • Multiple Vitamins-Minerals (CENTRUM SILVER ULTRA WOMENS) Oral Tab Take 1 tablet by mouth daily. • vitamin A 92810 UNITS Oral Cap Take 10,000 Units by mouth daily.      • Ca for eval and treat. Also referred to Dr. Johanny Castaneda for further eval as ROM is quite limited and there is some weakness. Instructed to notify office if not improved with these measures or if symptoms worsen.  Verbalized understanding of instructions and agree

## 2020-02-20 ENCOUNTER — HOSPITAL ENCOUNTER (OUTPATIENT)
Dept: GENERAL RADIOLOGY | Facility: HOSPITAL | Age: 70
Discharge: HOME | End: 2020-02-20
Attending: ORTHOPAEDIC SURGERY
Payer: MEDICARE

## 2020-02-20 DIAGNOSIS — M25.512 LEFT SHOULDER PAIN, UNSPECIFIED CHRONICITY: ICD-10-CM

## 2020-02-20 PROCEDURE — 73030 X-RAY EXAM OF SHOULDER: CPT | Performed by: ORTHOPAEDIC SURGERY

## 2020-02-26 ENCOUNTER — OFFICE VISIT (OUTPATIENT)
Dept: ORTHOPEDICS CLINIC | Facility: CLINIC | Age: 70
End: 2020-02-26
Payer: MEDICARE

## 2020-02-26 VITALS
OXYGEN SATURATION: 92 % | HEIGHT: 64.57 IN | BODY MASS INDEX: 31.54 KG/M2 | WEIGHT: 187 LBS | RESPIRATION RATE: 16 BRPM | HEART RATE: 85 BPM

## 2020-02-26 DIAGNOSIS — M75.82 TENDINITIS OF LEFT ROTATOR CUFF: Primary | ICD-10-CM

## 2020-02-26 DIAGNOSIS — M75.42 SUBACROMIAL IMPINGEMENT OF LEFT SHOULDER: ICD-10-CM

## 2020-02-26 PROCEDURE — 99203 OFFICE O/P NEW LOW 30 MIN: CPT | Performed by: ORTHOPAEDIC SURGERY

## 2020-02-26 RX ORDER — MELOXICAM 7.5 MG/1
7.5 TABLET ORAL DAILY
Qty: 30 TABLET | Refills: 0 | Status: SHIPPED | OUTPATIENT
Start: 2020-02-26 | End: 2020-04-09

## 2020-02-27 NOTE — H&P
[facility] New Patient Note    CC: Patient presents with:  Consult: left shoulder/arm apin x 5 weeks. pain with ROM. posterior/anterior pain. sharp pain.  numbness/tingling. weakness- issues with gripping. right hand dominant.   previous injection SURGERY  11/18/1997   • LAMINECTOMY,LUMBAR     • LAPAROSCOPIC CHOLECYSTECTOMY     • OTHER SURGICAL HISTORY      neuroplasty w/ decompress median nerve at carpal tunnel   • OTHER SURGICAL HISTORY      neuroplasty w/ transposit of ularnerve at elbow   • SPIN Acute Lymphcytic Leukemia   • Diabetes Mother    • Hypertension Mother    • Cancer Brother         Colon   • Other (acute MI) Father    • Other (hodgkin's disease) Father    • Heart Disorder Neg    • Lipids Neg    • Obesity Neg    • Psychiatric Neg      So joint space      Assessment/Plan:  Diagnoses and all orders for this visit:    Tendinitis of left rotator cuff  -     OP REFERRAL TO [facility]    Subacromial impingement of left shoulder  -     OP REFERRAL TO [facility], as well as trying naproxen. We did discuss meloxicam, which patient is amenable to and this order has been placed. Patient will follow-up with us in 2 months for reevaluation, or call sooner if needed.     The above note was creating using Dragon

## 2020-03-06 ENCOUNTER — TELEPHONE (OUTPATIENT)
Dept: ORTHOPEDICS CLINIC | Facility: CLINIC | Age: 70
End: 2020-03-06

## 2020-03-06 NOTE — TELEPHONE ENCOUNTER
Pt states that she is in severe pain due to her left shoulder, the meloxicam and otc are not working. Is there anything else that she can take or day. Pt was audibly upset as she cannot bear the pain.  Please advise    [facility] [place]

## 2020-03-06 NOTE — TELEPHONE ENCOUNTER
Maria C Root, MONAE routed conversation to Milena Parker 2 minutes ago (4:43 PM)      MD Maria C Cleary RN; Flakito May; Margarita Inman NP 1 hour ago (3:26 PM)      Would advise patient that the things I would recommend for her should

## 2020-03-06 NOTE — TELEPHONE ENCOUNTER
Per last office visit note:    \"Radiographically she has inferior humeral head subluxation and some degree of glenohumeral arthritis.   Given that she has pain with both active and passive range of motion, it is possible that glenohumeral arthritis is caus

## 2020-03-09 NOTE — TELEPHONE ENCOUNTER
S/w Daiana Link and informed her that per Dr. Mikael Rodriguez she can take 2 tabs of the 7.5mg Meloxicam.  Pt stated that she will try this.   Pt was informed that if she doesn't get relief it is recommended that she see a shoulder specialist or a pain management physic

## 2020-03-18 ENCOUNTER — TELEPHONE (OUTPATIENT)
Dept: PHYSICAL THERAPY | Facility: HOSPITAL | Age: 70
End: 2020-03-18

## 2020-03-18 NOTE — TELEPHONE ENCOUNTER
Patient returned PT VM. Patient would like to cancel appointments for next two weeks.  PT then returned call and got VM, requested call back if patient would like to reschedule those 2 appointments to end of appointment list.

## 2020-03-18 NOTE — TELEPHONE ENCOUNTER
Contacted pt to discuss department's initiative to protect all non-essential and high risk patients from COVID-19 exposure and clinic plans to cancel these patients for the next 2 weeks.  Left VM with recommendations relative to holding evaluation due to pa

## 2020-03-23 DIAGNOSIS — E78.00 PURE HYPERCHOLESTEROLEMIA: ICD-10-CM

## 2020-03-23 RX ORDER — ATORVASTATIN CALCIUM 20 MG/1
TABLET, FILM COATED ORAL
Qty: 90 TABLET | Refills: 0 | Status: SHIPPED | OUTPATIENT
Start: 2020-03-23 | End: 2020-06-15

## 2020-03-23 NOTE — TELEPHONE ENCOUNTER
Requested Prescriptions     Pending Prescriptions Disp Refills   • ATORVASTATIN 20 MG Oral Tab [Pharmacy Med Name: ATORVASTATIN 20 MG TABLET] 90 tablet 0     Sig: TAKE 1 TABLET BY MOUTH EVERY DAY EVERY NIGHT     LOV 2/17/2020         Appointment scheduled:

## 2020-03-24 ENCOUNTER — TELEPHONE (OUTPATIENT)
Dept: PHYSICAL THERAPY | Facility: HOSPITAL | Age: 70
End: 2020-03-24

## 2020-03-24 ENCOUNTER — APPOINTMENT (OUTPATIENT)
Dept: PHYSICAL THERAPY | Facility: HOSPITAL | Age: 70
End: 2020-03-24
Attending: ORTHOPAEDIC SURGERY
Payer: MEDICARE

## 2020-03-24 NOTE — TELEPHONE ENCOUNTER
Contacted pt to discuss department's initiative to protect all non-essential and high risk patients from COVID-19 exposure. Left VM to explain that the clinic is cancelling visits until April 13th.  Therapist left desk phone number with request to call back

## 2020-03-25 ENCOUNTER — TELEPHONE (OUTPATIENT)
Dept: PHYSICAL THERAPY | Facility: HOSPITAL | Age: 70
End: 2020-03-25

## 2020-03-25 NOTE — TELEPHONE ENCOUNTER
Patient left VM for PT that she is agreeable to cancellation of PT appointments through April 13th. Will reschedule for later date. PT attempted to call to reschedule 6 visits, but received VM.  Left  message with  number for patient to call

## 2020-03-26 ENCOUNTER — APPOINTMENT (OUTPATIENT)
Dept: PHYSICAL THERAPY | Facility: HOSPITAL | Age: 70
End: 2020-03-26
Attending: ORTHOPAEDIC SURGERY
Payer: MEDICARE

## 2020-03-26 ENCOUNTER — TELEPHONE (OUTPATIENT)
Dept: FAMILY MEDICINE CLINIC | Facility: CLINIC | Age: 70
End: 2020-03-26

## 2020-03-26 DIAGNOSIS — M79.671 RIGHT FOOT PAIN: Primary | ICD-10-CM

## 2020-03-26 PROCEDURE — G2012 BRIEF CHECK IN BY MD/QHP: HCPCS | Performed by: PHYSICIAN ASSISTANT

## 2020-03-26 NOTE — TELEPHONE ENCOUNTER
Virtual/Telephone Check-In    Maryuri Aguillon verbally consents to a Virtual/Telephone Check-In service on 3/26/2020. Patient understands and accepts financial responsibility for any deductible, co-insurance and/or co-pays associated with this service.

## 2020-03-26 NOTE — TELEPHONE ENCOUNTER
C/o right foot being swollen from Saturday no injury she has noticed painful to touch unable wear shoes with a tongue at night she sits and elevates the foot swelling is better in the morning pain in center of distal metacarpals.

## 2020-03-26 NOTE — TELEPHONE ENCOUNTER
Virtual [address]  verbally consents to a [place] on 03/26/20. Patient understands and accepts financial responsibility for any deductible, co-insurance and/or co-pays associated with this service.     Duration of the serv

## 2020-03-27 ENCOUNTER — HOSPITAL ENCOUNTER (OUTPATIENT)
Dept: GENERAL RADIOLOGY | Age: 70
Discharge: HOME | End: 2020-03-27
Attending: PHYSICIAN ASSISTANT
Payer: MEDICARE

## 2020-03-27 DIAGNOSIS — M79.671 RIGHT FOOT PAIN: ICD-10-CM

## 2020-03-27 PROCEDURE — 73630 X-RAY EXAM OF FOOT: CPT | Performed by: PHYSICIAN ASSISTANT

## 2020-03-31 ENCOUNTER — APPOINTMENT (OUTPATIENT)
Dept: PHYSICAL THERAPY | Facility: HOSPITAL | Age: 70
End: 2020-03-31
Attending: ORTHOPAEDIC SURGERY
Payer: MEDICARE

## 2020-04-02 ENCOUNTER — APPOINTMENT (OUTPATIENT)
Dept: PHYSICAL THERAPY | Facility: HOSPITAL | Age: 70
End: 2020-04-02
Attending: ORTHOPAEDIC SURGERY
Payer: MEDICARE

## 2020-04-06 ENCOUNTER — TELEPHONE (OUTPATIENT)
Dept: PHYSICAL THERAPY | Facility: HOSPITAL | Age: 70
End: 2020-04-06

## 2020-04-07 ENCOUNTER — APPOINTMENT (OUTPATIENT)
Dept: PHYSICAL THERAPY | Facility: HOSPITAL | Age: 70
End: 2020-04-07
Attending: ORTHOPAEDIC SURGERY
Payer: MEDICARE

## 2020-04-07 ENCOUNTER — TELEPHONE (OUTPATIENT)
Dept: PHYSICAL THERAPY | Facility: HOSPITAL | Age: 70
End: 2020-04-07

## 2020-04-07 NOTE — TELEPHONE ENCOUNTER
Left detailed VM for patient. Contacted pt to explain that due to continued COVID-19 outbreak, non-essential outpatient rehab patient care has been delayed until 5/4/20.  Left PT contact information to discussed patient's current symptoms and concerns at th

## 2020-04-09 ENCOUNTER — APPOINTMENT (OUTPATIENT)
Dept: PHYSICAL THERAPY | Facility: HOSPITAL | Age: 70
End: 2020-04-09
Attending: ORTHOPAEDIC SURGERY
Payer: MEDICARE

## 2020-04-09 RX ORDER — MELOXICAM 7.5 MG/1
TABLET ORAL
Qty: 30 TABLET | Refills: 0 | Status: SHIPPED | OUTPATIENT
Start: 2020-04-09 | End: 2020-09-24

## 2020-04-14 ENCOUNTER — APPOINTMENT (OUTPATIENT)
Dept: PHYSICAL THERAPY | Facility: HOSPITAL | Age: 70
End: 2020-04-14
Attending: ORTHOPAEDIC SURGERY
Payer: MEDICARE

## 2020-04-16 ENCOUNTER — APPOINTMENT (OUTPATIENT)
Dept: PHYSICAL THERAPY | Facility: HOSPITAL | Age: 70
End: 2020-04-16
Attending: ORTHOPAEDIC SURGERY
Payer: MEDICARE

## 2020-06-15 DIAGNOSIS — E78.00 PURE HYPERCHOLESTEROLEMIA: ICD-10-CM

## 2020-06-15 RX ORDER — ATORVASTATIN CALCIUM 20 MG/1
TABLET, FILM COATED ORAL
Qty: 90 TABLET | Refills: 0 | Status: SHIPPED | OUTPATIENT
Start: 2020-06-15 | End: 2020-09-15

## 2020-06-22 ENCOUNTER — TELEPHONE (OUTPATIENT)
Dept: PHYSICAL THERAPY | Facility: HOSPITAL | Age: 70
End: 2020-06-22

## 2020-09-02 DIAGNOSIS — I10 ESSENTIAL HYPERTENSION: ICD-10-CM

## 2020-09-03 NOTE — TELEPHONE ENCOUNTER
Requested Prescriptions     Pending Prescriptions Disp Refills   • LISINOPRIL-HYDROCHLOROTHIAZIDE 20-12.5 MG Oral Tab [Pharmacy Med Name: LISINOPRIL-HCTZ 20-12.5 MG TAB] 90 tablet 3     Sig: TAKE 1 TABLET BY MOUTH EVERY DAY     LOV 2/17/2020 (acute)  Last

## 2020-09-09 RX ORDER — LISINOPRIL AND HYDROCHLOROTHIAZIDE 20; 12.5 MG/1; MG/1
TABLET ORAL
Qty: 90 TABLET | Refills: 3 | Status: SHIPPED | OUTPATIENT
Start: 2020-09-09 | End: 2021-09-10

## 2020-09-09 NOTE — TELEPHONE ENCOUNTER
Pt is scheduled for medicare wellness 9/17 with Sami Ferreira pt is out of medication and needs rx sent to pharmacy

## 2020-09-10 DIAGNOSIS — E78.00 PURE HYPERCHOLESTEROLEMIA: ICD-10-CM

## 2020-09-12 ENCOUNTER — TELEPHONE (OUTPATIENT)
Dept: FAMILY MEDICINE CLINIC | Facility: CLINIC | Age: 70
End: 2020-09-12

## 2020-09-15 RX ORDER — ATORVASTATIN CALCIUM 20 MG/1
TABLET, FILM COATED ORAL
Qty: 90 TABLET | Refills: 0 | Status: SHIPPED | OUTPATIENT
Start: 2020-09-15 | End: 2020-09-24

## 2020-09-15 NOTE — TELEPHONE ENCOUNTER
LOV 7/11/19, next appt 9/24/20  Last labs 10/24/19  Refilled atorvastatin per protocol- Pt will un out before appt

## 2020-09-24 ENCOUNTER — OFFICE VISIT (OUTPATIENT)
Dept: FAMILY MEDICINE CLINIC | Facility: CLINIC | Age: 70
End: 2020-09-24
Payer: MEDICARE

## 2020-09-24 VITALS
SYSTOLIC BLOOD PRESSURE: 132 MMHG | DIASTOLIC BLOOD PRESSURE: 70 MMHG | HEART RATE: 60 BPM | TEMPERATURE: 99 F | HEIGHT: 64 IN | RESPIRATION RATE: 16 BRPM | BODY MASS INDEX: 31.41 KG/M2 | WEIGHT: 184 LBS

## 2020-09-24 DIAGNOSIS — Z12.31 VISIT FOR SCREENING MAMMOGRAM: ICD-10-CM

## 2020-09-24 DIAGNOSIS — E78.00 PURE HYPERCHOLESTEROLEMIA: ICD-10-CM

## 2020-09-24 DIAGNOSIS — I10 ESSENTIAL HYPERTENSION: ICD-10-CM

## 2020-09-24 DIAGNOSIS — Z00.00 ENCOUNTER FOR ANNUAL HEALTH EXAMINATION: Primary | ICD-10-CM

## 2020-09-24 DIAGNOSIS — R42 VERTIGO: ICD-10-CM

## 2020-09-24 DIAGNOSIS — Z91.89 AT RISK FOR DECREASED BONE DENSITY: ICD-10-CM

## 2020-09-24 DIAGNOSIS — Z78.0 MENOPAUSE: ICD-10-CM

## 2020-09-24 PROCEDURE — 99397 PER PM REEVAL EST PAT 65+ YR: CPT | Performed by: FAMILY MEDICINE

## 2020-09-24 PROCEDURE — 96160 PT-FOCUSED HLTH RISK ASSMT: CPT | Performed by: FAMILY MEDICINE

## 2020-09-24 PROCEDURE — G0439 PPPS, SUBSEQ VISIT: HCPCS | Performed by: FAMILY MEDICINE

## 2020-09-24 RX ORDER — ATORVASTATIN CALCIUM 20 MG/1
20 TABLET, FILM COATED ORAL NIGHTLY
Qty: 90 TABLET | Refills: 1 | Status: SHIPPED | OUTPATIENT
Start: 2020-09-24 | End: 2021-04-12

## 2020-09-24 RX ORDER — MECLIZINE HYDROCHLORIDE 25 MG/1
TABLET ORAL
Qty: 180 TABLET | Refills: 1 | Status: SHIPPED | OUTPATIENT
Start: 2020-09-24 | End: 2021-04-12

## 2020-09-24 NOTE — PROGRESS NOTES
HPI:   Brook Oliver is a 65year old female who presents for a Medicare Subsequent Annual Wellness visit (Pt already had Initial Annual Wellness). Using CBD oil for left shoulder pain.       Her last annual assessment has been over 1 year: Annual Phys Cholesterol Labs:   Lab Results   Component Value Date    CHOLEST 169 10/24/2019    HDL 68 (H) 10/24/2019    LDL 76 10/24/2019    TRIG 127 10/24/2019          Last Chemistry Labs:   Lab Results   Component Value Date    AST 20 07/11/2019    ALT 21 07/11/20 surgery procedure unlisted; other surgical history; other surgical history; spine surgery procedure unlisted; total abdom hysterectomy; and forearm/wrist surgery unlisted.     Her family history includes Cancer in her brother and daughter; Diabetes in her m Normocephalic, without obvious abnormality, atraumatic   Eyes:  PERRL, conjunctiva/corneas clear, EOM's intact both eyes   Ears:  Normal TM's and external ear canals, both ears   Nose: Nares normal, septum midline,mucosa normal, no drainage or sinus tender PANEL; Future  -     atorvastatin 20 MG Oral Tab; Take 1 tablet (20 mg total) by mouth nightly. Visit for screening mammogram  -     [facility] SCREENING BILAT (CPT=77067);  Future    At risk for decreased bone density  -     XR DEXA BONE DENSITOMETRY (CPT=7704 Sigmoidoscopy Screen every 10 years No results found for this or any previous visit. No flowsheet data found. Fecal Occult Blood Annually No results found for: FOB No flowsheet data found.     Glaucoma Screening      Ophthalmology Visit Annually: [facility] SPECIFIC DISEASE MONITORING Internal Lab or Procedure External Lab or Procedure      Annual Monitoring of Persistent     Medications (ACE/ARB, digoxin diuretics, anticonvulsants.)    Potassium  Annually Potassium (mmol/L)   Date Value   07/23/2019 4.8

## 2020-09-24 NOTE — PATIENT INSTRUCTIONS
Lamberto Alcaraz's SCREENING SCHEDULE   Tests on this list are recommended by your physician but may not be covered, or covered at this frequency, by your insurer. Please check with your insurance carrier before scheduling to verify coverage.    PREVENTATI following criteria:   • Men who are 73-68 years old and have smoked more than 100 cigarettes in their lifetime   • Anyone with a family history    Colorectal Cancer Screening  Covered up to Age 76     Colonoscopy Screen   Covered every 10 years- more often any previous visit.  Please get every year    Pneumococcal 13 (Prevnar)  Covered Once after 65 Orders placed or performed in visit on 05/17/18   • PNEUMOCOCCAL VACC, 13 DOREEN IM    Please get once after your 65th birthday    Pneumococcal 23 (Pneumovax)  Cover

## 2020-11-05 ENCOUNTER — LAB ENCOUNTER (OUTPATIENT)
Dept: LAB | Age: 70
End: 2020-11-05
Attending: FAMILY MEDICINE
Payer: MEDICARE

## 2020-11-05 ENCOUNTER — HOSPITAL ENCOUNTER (OUTPATIENT)
Dept: BONE DENSITY | Age: 70
Discharge: HOME | End: 2020-11-05
Attending: FAMILY MEDICINE
Payer: MEDICARE

## 2020-11-05 DIAGNOSIS — Z78.0 MENOPAUSE: ICD-10-CM

## 2020-11-05 DIAGNOSIS — Z00.00 ENCOUNTER FOR ANNUAL HEALTH EXAMINATION: ICD-10-CM

## 2020-11-05 DIAGNOSIS — Z91.89 AT RISK FOR DECREASED BONE DENSITY: ICD-10-CM

## 2020-11-05 DIAGNOSIS — I10 ESSENTIAL HYPERTENSION: ICD-10-CM

## 2020-11-05 DIAGNOSIS — E78.00 PURE HYPERCHOLESTEROLEMIA: ICD-10-CM

## 2020-11-05 PROCEDURE — 77080 DXA BONE DENSITY AXIAL: CPT | Performed by: FAMILY MEDICINE

## 2020-11-05 PROCEDURE — 36415 COLL VENOUS BLD VENIPUNCTURE: CPT

## 2020-11-05 PROCEDURE — 80053 COMPREHEN METABOLIC PANEL: CPT

## 2020-11-05 PROCEDURE — 80061 LIPID PANEL: CPT

## 2020-11-10 NOTE — PROGRESS NOTES
Left message on answering machine to call triage to discuss test results. See DEXA scan results as well

## 2020-11-19 NOTE — PROGRESS NOTES
Taras Brown called saying she is only able to find Calcium 600mg and is asking if it is ok to take two 600mg tablets for a total of 1200mg. Told her it is ok.

## 2021-03-12 DIAGNOSIS — Z23 NEED FOR VACCINATION: ICD-10-CM

## 2021-04-11 DIAGNOSIS — R42 VERTIGO: ICD-10-CM

## 2021-04-11 DIAGNOSIS — E78.00 PURE HYPERCHOLESTEROLEMIA: ICD-10-CM

## 2021-04-12 RX ORDER — MECLIZINE HYDROCHLORIDE 25 MG/1
TABLET ORAL
Qty: 180 TABLET | Refills: 0 | Status: SHIPPED | OUTPATIENT
Start: 2021-04-12 | End: 2021-07-21

## 2021-04-12 RX ORDER — ATORVASTATIN CALCIUM 20 MG/1
TABLET, FILM COATED ORAL
Qty: 90 TABLET | Refills: 0 | Status: SHIPPED | OUTPATIENT
Start: 2021-04-12 | End: 2021-08-13

## 2021-04-12 NOTE — TELEPHONE ENCOUNTER
Requested Prescriptions     Pending Prescriptions Disp Refills   • Meclizine HCl 25 MG Oral Tab [Pharmacy Med Name: MECLIZINE 25 MG TABLET] 180 tablet 1     Sig: TAKE 1 TABLET BY MOUTH TWICE A DAY   • ATORVASTATIN 20 MG Oral Tab [Pharmacy Med Name: Carmen Rudd

## 2021-05-11 NOTE — TELEPHONE ENCOUNTER
Third attempt LM to advised pt is due for MA Supervisit for any further refills. 3 tries letter sent.

## 2021-05-20 ENCOUNTER — TELEPHONE (OUTPATIENT)
Dept: FAMILY MEDICINE CLINIC | Facility: CLINIC | Age: 71
End: 2021-05-20

## 2021-05-20 DIAGNOSIS — I10 ESSENTIAL HYPERTENSION: ICD-10-CM

## 2021-05-20 DIAGNOSIS — E78.00 PURE HYPERCHOLESTEROLEMIA: Primary | ICD-10-CM

## 2021-05-20 NOTE — TELEPHONE ENCOUNTER
Please enter lab orders for the patient's upcoming physical appointment. Physical scheduled:    Your appointments     Date & Time Appointment Department [facility])    May 27, 2021  1:30 PM CDT MA Supervisit with MERVAT [facility], [place] [Preoperative Visit] : for a medical evaluation prior to surgery [Scheduled Procedure ___] : a [unfilled] [Stable] : Stable [Chest Pain] : no chest pain [Dyspnea] : no dyspnea [Lower Extremity Swelling] : no lower extremity swelling [Metabolic Capacity ___Mets%] : The patient has a metabolic capacity of [unfilled] Mets%  [Fair] : Fair [FreeTextEntry1] : 68-year-old female with a past medical history of ventricular tachycardia status post AICD, cardiac arrest hypertension hyperlipidemia comes in for preop clearance for hysterectomy. Patient with endometrial cancer plan to have hysterectomy. Patient denies any chest pain shortness of breath PND orthopnea or change from baseline.

## 2021-05-24 ENCOUNTER — TELEPHONE (OUTPATIENT)
Dept: FAMILY MEDICINE CLINIC | Facility: CLINIC | Age: 71
End: 2021-05-24

## 2021-05-26 NOTE — TELEPHONE ENCOUNTER
LM to advise labs have been ordered and labs are fasting to fast for 10-12 hours and make appointment with central scheduling.

## 2021-05-27 ENCOUNTER — HOSPITAL ENCOUNTER (OUTPATIENT)
Dept: MAMMOGRAPHY | Facility: HOSPITAL | Age: 71
Discharge: HOME | End: 2021-05-27
Attending: FAMILY MEDICINE
Payer: MEDICARE

## 2021-05-27 ENCOUNTER — OFFICE VISIT (OUTPATIENT)
Dept: FAMILY MEDICINE CLINIC | Facility: CLINIC | Age: 71
End: 2021-05-27
Payer: MEDICARE

## 2021-05-27 VITALS
HEIGHT: 63.5 IN | WEIGHT: 185.63 LBS | TEMPERATURE: 98 F | SYSTOLIC BLOOD PRESSURE: 128 MMHG | BODY MASS INDEX: 32.48 KG/M2 | HEART RATE: 72 BPM | RESPIRATION RATE: 16 BRPM | DIASTOLIC BLOOD PRESSURE: 80 MMHG

## 2021-05-27 DIAGNOSIS — Z12.31 VISIT FOR SCREENING MAMMOGRAM: ICD-10-CM

## 2021-05-27 DIAGNOSIS — I10 ESSENTIAL HYPERTENSION: ICD-10-CM

## 2021-05-27 DIAGNOSIS — L21.9 SEBORRHEIC DERMATITIS: ICD-10-CM

## 2021-05-27 DIAGNOSIS — Z00.00 ENCOUNTER FOR ANNUAL HEALTH EXAMINATION: Primary | ICD-10-CM

## 2021-05-27 DIAGNOSIS — E78.00 PURE HYPERCHOLESTEROLEMIA: ICD-10-CM

## 2021-05-27 PROBLEM — M84.375A STRESS REACTION OF LEFT FOOT, INITIAL ENCOUNTER: Status: RESOLVED | Noted: 2017-07-18 | Resolved: 2021-05-27

## 2021-05-27 PROBLEM — R42 VERTIGO: Status: RESOLVED | Noted: 2017-04-22 | Resolved: 2021-05-27

## 2021-05-27 PROBLEM — M84.375D STRESS FRACTURE OF LEFT FOOT WITH ROUTINE HEALING, SUBSEQUENT ENCOUNTER: Status: RESOLVED | Noted: 2017-07-28 | Resolved: 2021-05-27

## 2021-05-27 PROBLEM — M70.22 OLECRANON BURSITIS OF LEFT ELBOW: Status: RESOLVED | Noted: 2018-10-11 | Resolved: 2021-05-27

## 2021-05-27 PROCEDURE — 3074F SYST BP LT 130 MM HG: CPT | Performed by: PHYSICIAN ASSISTANT

## 2021-05-27 PROCEDURE — 3008F BODY MASS INDEX DOCD: CPT | Performed by: PHYSICIAN ASSISTANT

## 2021-05-27 PROCEDURE — 3079F DIAST BP 80-89 MM HG: CPT | Performed by: PHYSICIAN ASSISTANT

## 2021-05-27 PROCEDURE — 77067 SCR MAMMO BI INCL CAD: CPT | Performed by: FAMILY MEDICINE

## 2021-05-27 PROCEDURE — 96160 PT-FOCUSED HLTH RISK ASSMT: CPT | Performed by: PHYSICIAN ASSISTANT

## 2021-05-27 PROCEDURE — 77063 BREAST TOMOSYNTHESIS BI: CPT | Performed by: FAMILY MEDICINE

## 2021-05-27 PROCEDURE — G0439 PPPS, SUBSEQ VISIT: HCPCS | Performed by: PHYSICIAN ASSISTANT

## 2021-05-27 PROCEDURE — 99397 PER PM REEVAL EST PAT 65+ YR: CPT | Performed by: PHYSICIAN ASSISTANT

## 2021-06-01 NOTE — PROGRESS NOTES
HPI:   Dann Cline is a 65year old female who presents for a MA (Medicare Advantage) [facility] (Once per calendar year). - HTN: Taking lisinopril-hydrochlorothiazide daily as prescribed. BP has been adequately controlled.  No SE.   - Managing HLD Care on file in [address] [place] [address] Rd.    Advance care planning including the explanation and discussion of advance directives standard forms performed Face to Face with patient and Family/surrogate (if present), and forms available to patient in AVS       She has never smo Carbonate-Vitamin D 600-400 MG-UNIT Oral Tab, Take 1 tablet by mouth daily. Misc Natural Products (OSTEO BI-FLEX TRIPLE STRENGTH) Oral Tab, Take 1 tablet by mouth daily. Multiple Vitamins-Minerals (ANTIOXIDANT) Oral Tab, Take 1 tablet by mouth daily.   as chest pain and palpitations. Gastrointestinal: Negative for abdominal pain. Genitourinary: Negative for dysuria. Musculoskeletal: Negative for myalgias and neck pain. Skin: Negative for rash. Neurological: Negative for dizziness.          EXAM: no cyanosis or edema   Pulses: 2+ and symmetric   Skin: Skin color, texture, turgor normal, no rashes or lesions   Lymph nodes: Cervical, supraclavicular, and axillary nodes normal   Neurologic: Normal       Vaccination History     Immunization History   A SERVICES  INDICATIONS AND SCHEDULE Internal Lab or Procedure External Lab or Procedure   Diabetes Screening      HbgA1C   Annually No results found for: A1C No flowsheet data found.     Fasting Blood Sugar (FSB)Annually Glucose (mg/dL)   Date Value   11/05/ (Pneumovax)  Covered Once after 65 07/11/2019 Please get once after your 65th birthday    Hepatitis B for Moderate/High Risk No vaccine history found Medium/high risk factors:   End-stage renal disease   Hemophiliacs who received Factor VIII or IX concentr

## 2021-06-01 NOTE — PATIENT INSTRUCTIONS
Leelee Alcaraz's SCREENING SCHEDULE   Tests on this list are recommended by your physician but may not be covered, or covered at this frequency, by your insurer. Please check with your insurance carrier before scheduling to verify coverage.    PREVENTATI following criteria:   • Men who are 73-68 years old and have smoked more than 100 cigarettes in their lifetime   • Anyone with a family history    Colorectal Cancer Screening  Covered up to Age 76     Colonoscopy Screen   Covered every 10 years- more often orders found for this or any previous visit.  Please get every year    Pneumococcal 13 (Prevnar)  Covered Once after 65 Orders placed or performed in visit on 05/17/18   • PNEUMOCOCCAL VACC, 13 DOREEN IM    Please get once after your 65th birthday    Apolinar Pisano

## 2021-06-10 ENCOUNTER — LAB ENCOUNTER (OUTPATIENT)
Dept: LAB | Age: 71
End: 2021-06-10
Attending: PHYSICIAN ASSISTANT
Payer: MEDICARE

## 2021-06-10 DIAGNOSIS — E78.00 PURE HYPERCHOLESTEROLEMIA: ICD-10-CM

## 2021-06-10 DIAGNOSIS — I10 ESSENTIAL HYPERTENSION: ICD-10-CM

## 2021-06-10 PROCEDURE — 84443 ASSAY THYROID STIM HORMONE: CPT

## 2021-06-10 PROCEDURE — 80061 LIPID PANEL: CPT

## 2021-06-10 PROCEDURE — 36415 COLL VENOUS BLD VENIPUNCTURE: CPT

## 2021-06-10 PROCEDURE — 85025 COMPLETE CBC W/AUTO DIFF WBC: CPT

## 2021-06-10 PROCEDURE — 80053 COMPREHEN METABOLIC PANEL: CPT

## 2021-07-17 DIAGNOSIS — R42 VERTIGO: ICD-10-CM

## 2021-07-19 RX ORDER — KETOCONAZOLE 20 MG/ML
SHAMPOO TOPICAL
COMMUNITY
Start: 2021-05-27

## 2021-07-19 NOTE — TELEPHONE ENCOUNTER
MECLIZINE 25 MG TABLET     Sig: TAKE 1 TABLET BY MOUTH TWICE A DAY    Disp:  180 tablet    Refills:  0 (Pharmacy requested: Not specified)    Start: 7/17/2021    Class: Normal    Non-formulary For: Vertigo    To pharmacy: PT OUT OF REFILLS.  PLEASE SEND OVE

## 2021-07-19 NOTE — TELEPHONE ENCOUNTER
LOV medicare annual w/Bharat PAC on 05/27/2021  Meclizine not address vertigo  Last addressed w/Hanna PAC on 09/24/2020  No follow-up appt scheduled  Forward to [facility]

## 2021-07-20 NOTE — TELEPHONE ENCOUNTER
Pt calling to check on status on refill. States refill was discussed with [address] at [facility] 5/27/21. Pt is down to one dose.

## 2021-07-21 RX ORDER — MECLIZINE HYDROCHLORIDE 25 MG/1
TABLET ORAL
Qty: 180 TABLET | Refills: 0 | Status: SHIPPED | OUTPATIENT
Start: 2021-07-21 | End: 2021-10-13

## 2021-08-11 DIAGNOSIS — E78.00 PURE HYPERCHOLESTEROLEMIA: ICD-10-CM

## 2021-08-13 RX ORDER — ATORVASTATIN CALCIUM 20 MG/1
TABLET, FILM COATED ORAL
Qty: 90 TABLET | Refills: 2 | Status: SHIPPED | OUTPATIENT
Start: 2021-08-13

## 2021-08-13 NOTE — TELEPHONE ENCOUNTER
ATORVASTATIN 20 MG TABLET     Sig: TAKE 1 TABLET BY MOUTH EVERY DAY AT NIGHT    Disp:  90 tablet    Refills:  0 (Pharmacy requested: Not specified)    Start: 8/11/2021    Class: Normal    Non-formulary For: Pure hypercholesterolemia    Last ordered: 4 rigo

## 2021-08-13 NOTE — TELEPHONE ENCOUNTER
LOV 05/27/2021 w/ [facility] for Annual wellness, last lipid 06/10/21  Noted  Pure hypercholesterolemia  Stable. Continue atorvastatin.     Will refill until anniversary of NORMA

## 2021-09-02 DIAGNOSIS — I10 ESSENTIAL HYPERTENSION: ICD-10-CM

## 2021-09-10 RX ORDER — LISINOPRIL AND HYDROCHLOROTHIAZIDE 20; 12.5 MG/1; MG/1
TABLET ORAL
Qty: 90 TABLET | Refills: 0 | Status: SHIPPED | OUTPATIENT
Start: 2021-09-10 | End: 2021-12-09

## 2021-09-10 NOTE — TELEPHONE ENCOUNTER
LISINOPRIL-HCTZ 20-12.5 MG TAB     Sig: TAKE 1 TABLET BY MOUTH EVERY DAY    Disp:  90 tablet    Refills:  3    Start: 9/2/2021    Class: Normal    Non-formulary For: Essential hypertension    Last ordered: 1 year ago by Eber Connelly PA-C Last refill: 6/

## 2021-09-10 NOTE — TELEPHONE ENCOUNTER
LOV 05/27/2021 W/ [facility] for physical  Last labs 06/10/21  Noted  Essential hypertension  Stable. Cont lisinopril-hydrochlorothiazide. No follow-ups requested.

## 2021-10-13 DIAGNOSIS — R42 VERTIGO: ICD-10-CM

## 2021-10-13 NOTE — TELEPHONE ENCOUNTER
Pt calling to request refill on Meclizine. Pt is out of the medication. Also wants to know why she no longer gets the refill for the whole year.

## 2021-10-14 RX ORDER — MECLIZINE HYDROCHLORIDE 25 MG/1
25 TABLET ORAL 2 TIMES DAILY
Qty: 180 TABLET | Refills: 2 | Status: SHIPPED | OUTPATIENT
Start: 2021-10-14

## 2021-12-09 ENCOUNTER — TELEPHONE (OUTPATIENT)
Dept: FAMILY MEDICINE CLINIC | Facility: CLINIC | Age: 71
End: 2021-12-09

## 2021-12-09 DIAGNOSIS — I10 ESSENTIAL HYPERTENSION: ICD-10-CM

## 2021-12-09 RX ORDER — LISINOPRIL AND HYDROCHLOROTHIAZIDE 20; 12.5 MG/1; MG/1
1 TABLET ORAL DAILY
Qty: 90 TABLET | Refills: 0 | Status: SHIPPED | OUTPATIENT
Start: 2021-12-09

## 2021-12-09 NOTE — TELEPHONE ENCOUNTER
Pt called requesting a refill on LISINOPRIL-HYDROCHLOROTHIAZIDE 20-12.5 MG Oral Tab. Pt was made aware she is due for her 6 month follow up and she declined to schedule she asked to put the request for her.  Preferred Pharmacy:    [place] [phone] [facility] - [place]

## 2022-03-04 ENCOUNTER — TELEPHONE (OUTPATIENT)
Dept: FAMILY MEDICINE CLINIC | Facility: CLINIC | Age: 72
End: 2022-03-04

## 2022-03-04 DIAGNOSIS — I10 ESSENTIAL HYPERTENSION: Primary | ICD-10-CM

## 2022-03-04 DIAGNOSIS — E78.00 PURE HYPERCHOLESTEROLEMIA: ICD-10-CM

## 2022-03-04 NOTE — TELEPHONE ENCOUNTER
Please enter lab orders for the patient's upcoming physical appointment. Physical scheduled: Your appointments     Date & Time Appointment Department [facility])    May 26, 2022  9:30 AM CDT MA Supervisit with MERVAT [facility], [address], [place]  ([facility])            [address] [address] [place] [address] [phone]         Preferred lab: [address] [facility])     The patient has been notified to complete fasting labs prior to their physical appointment.

## 2022-03-08 RX ORDER — LISINOPRIL AND HYDROCHLOROTHIAZIDE 20; 12.5 MG/1; MG/1
TABLET ORAL
Qty: 90 TABLET | Refills: 0 | Status: SHIPPED | OUTPATIENT
Start: 2022-03-08

## 2022-03-09 ENCOUNTER — TELEPHONE (OUTPATIENT)
Dept: FAMILY MEDICINE CLINIC | Facility: CLINIC | Age: 72
End: 2022-03-09

## 2022-03-09 NOTE — TELEPHONE ENCOUNTER
Pt is calling she needs her [facility]inopril ref[facility]. She scheduled her MA Supervisit with Whitney Salas on 5/26/22. She is out of her medication.

## 2022-04-28 RX ORDER — ATORVASTATIN CALCIUM 20 MG/1
TABLET, FILM COATED ORAL
Qty: 90 TABLET | Refills: 0 | Status: SHIPPED | OUTPATIENT
Start: 2022-04-28

## 2022-05-18 ENCOUNTER — TELEPHONE (OUTPATIENT)
Dept: FAMILY MEDICINE CLINIC | Facility: CLINIC | Age: 72
End: 2022-05-18

## 2022-05-26 ENCOUNTER — LAB ENCOUNTER (OUTPATIENT)
Dept: LAB | Age: 72
End: 2022-05-26
Attending: PHYSICIAN ASSISTANT
Payer: MEDICARE

## 2022-05-26 ENCOUNTER — OFFICE VISIT (OUTPATIENT)
Dept: FAMILY MEDICINE CLINIC | Facility: CLINIC | Age: 72
End: 2022-05-26
Payer: MEDICARE

## 2022-05-26 VITALS
HEART RATE: 80 BPM | SYSTOLIC BLOOD PRESSURE: 120 MMHG | BODY MASS INDEX: 32.55 KG/M2 | TEMPERATURE: 98 F | RESPIRATION RATE: 16 BRPM | WEIGHT: 186 LBS | HEIGHT: 63.2 IN | DIASTOLIC BLOOD PRESSURE: 80 MMHG

## 2022-05-26 DIAGNOSIS — E78.00 PURE HYPERCHOLESTEROLEMIA: ICD-10-CM

## 2022-05-26 DIAGNOSIS — I10 ESSENTIAL HYPERTENSION: ICD-10-CM

## 2022-05-26 DIAGNOSIS — Z00.00 ENCOUNTER FOR ANNUAL HEALTH EXAMINATION: Primary | ICD-10-CM

## 2022-05-26 DIAGNOSIS — R42 VERTIGO: ICD-10-CM

## 2022-05-26 DIAGNOSIS — Z12.31 SCREENING MAMMOGRAM FOR BREAST CANCER: ICD-10-CM

## 2022-05-26 LAB
ALBUMIN SERPL-MCNC: 3.7 G/DL (ref 3.4–5)
ALBUMIN/GLOB SERPL: 1.1 {RATIO} (ref 1–2)
ALP LIVER SERPL-CCNC: 87 U/L
ALT SERPL-CCNC: 27 U/L
ANION GAP SERPL CALC-SCNC: 3 MMOL/L (ref 0–18)
AST SERPL-CCNC: 16 U/L (ref 15–37)
BASOPHILS # BLD AUTO: 0.16 X10(3) UL (ref 0–0.2)
BASOPHILS NFR BLD AUTO: 1.8 %
BILIRUB SERPL-MCNC: 0.5 MG/DL (ref 0.1–2)
BUN BLD-MCNC: 27 MG/DL (ref 7–18)
CALCIUM BLD-MCNC: 9.4 MG/DL (ref 8.5–10.1)
CHLORIDE SERPL-SCNC: 107 MMOL/L (ref 98–112)
CHOLEST SERPL-MCNC: 157 MG/DL (ref ?–200)
CO2 SERPL-SCNC: 30 MMOL/L (ref 21–32)
CREAT BLD-MCNC: 0.75 MG/DL
EOSINOPHIL # BLD AUTO: 0.26 X10(3) UL (ref 0–0.7)
EOSINOPHIL NFR BLD AUTO: 2.9 %
ERYTHROCYTE [DISTWIDTH] IN BLOOD BY AUTOMATED COUNT: 12 %
FASTING PATIENT LIPID ANSWER: YES
FASTING STATUS PATIENT QL REPORTED: YES
GLOBULIN PLAS-MCNC: 3.4 G/DL (ref 2.8–4.4)
GLUCOSE BLD-MCNC: 98 MG/DL (ref 70–99)
HCT VFR BLD AUTO: 45.2 %
HDLC SERPL-MCNC: 70 MG/DL (ref 40–59)
HGB BLD-MCNC: 14.6 G/DL
IMM GRANULOCYTES # BLD AUTO: 0.02 X10(3) UL (ref 0–1)
IMM GRANULOCYTES NFR BLD: 0.2 %
LDLC SERPL CALC-MCNC: 67 MG/DL (ref ?–100)
LYMPHOCYTES # BLD AUTO: 1.96 X10(3) UL (ref 1–4)
LYMPHOCYTES NFR BLD AUTO: 21.9 %
MCH RBC QN AUTO: 33.4 PG (ref 26–34)
MCHC RBC AUTO-ENTMCNC: 32.3 G/DL (ref 31–37)
MCV RBC AUTO: 103.4 FL
MONOCYTES # BLD AUTO: 0.8 X10(3) UL (ref 0.1–1)
MONOCYTES NFR BLD AUTO: 8.9 %
NEUTROPHILS # BLD AUTO: 5.74 X10 (3) UL (ref 1.5–7.7)
NEUTROPHILS # BLD AUTO: 5.74 X10(3) UL (ref 1.5–7.7)
NEUTROPHILS NFR BLD AUTO: 64.3 %
NONHDLC SERPL-MCNC: 87 MG/DL (ref ?–130)
OSMOLALITY SERPL CALC.SUM OF ELEC: 295 MOSM/KG (ref 275–295)
PLATELET # BLD AUTO: 300 10(3)UL (ref 150–450)
POTASSIUM SERPL-SCNC: 4.4 MMOL/L (ref 3.5–5.1)
PROT SERPL-MCNC: 7.1 G/DL (ref 6.4–8.2)
RBC # BLD AUTO: 4.37 X10(6)UL
SODIUM SERPL-SCNC: 140 MMOL/L (ref 136–145)
TRIGL SERPL-MCNC: 114 MG/DL (ref 30–149)
TSI SER-ACNC: 0.62 MIU/ML (ref 0.36–3.74)
VLDLC SERPL CALC-MCNC: 17 MG/DL (ref 0–30)
WBC # BLD AUTO: 8.9 X10(3) UL (ref 4–11)

## 2022-05-26 PROCEDURE — 80053 COMPREHEN METABOLIC PANEL: CPT

## 2022-05-26 PROCEDURE — 80061 LIPID PANEL: CPT

## 2022-05-26 PROCEDURE — 3008F BODY MASS INDEX DOCD: CPT | Performed by: PHYSICIAN ASSISTANT

## 2022-05-26 PROCEDURE — 3074F SYST BP LT 130 MM HG: CPT | Performed by: PHYSICIAN ASSISTANT

## 2022-05-26 PROCEDURE — 84443 ASSAY THYROID STIM HORMONE: CPT

## 2022-05-26 PROCEDURE — 36415 COLL VENOUS BLD VENIPUNCTURE: CPT

## 2022-05-26 PROCEDURE — 85025 COMPLETE CBC W/AUTO DIFF WBC: CPT

## 2022-05-26 PROCEDURE — 99397 PER PM REEVAL EST PAT 65+ YR: CPT | Performed by: PHYSICIAN ASSISTANT

## 2022-05-26 PROCEDURE — G0439 PPPS, SUBSEQ VISIT: HCPCS | Performed by: PHYSICIAN ASSISTANT

## 2022-05-26 PROCEDURE — 96160 PT-FOCUSED HLTH RISK ASSMT: CPT | Performed by: PHYSICIAN ASSISTANT

## 2022-05-26 PROCEDURE — 3079F DIAST BP 80-89 MM HG: CPT | Performed by: PHYSICIAN ASSISTANT

## 2022-05-26 RX ORDER — ATORVASTATIN CALCIUM 20 MG/1
20 TABLET, FILM COATED ORAL NIGHTLY
Qty: 90 TABLET | Refills: 3 | Status: SHIPPED | OUTPATIENT
Start: 2022-05-26

## 2022-05-26 RX ORDER — MECLIZINE HYDROCHLORIDE 25 MG/1
25 TABLET ORAL 2 TIMES DAILY
Qty: 180 TABLET | Refills: 3 | Status: SHIPPED | OUTPATIENT
Start: 2022-05-26

## 2022-05-26 RX ORDER — LISINOPRIL AND HYDROCHLOROTHIAZIDE 20; 12.5 MG/1; MG/1
1 TABLET ORAL DAILY
Qty: 90 TABLET | Refills: 3 | Status: SHIPPED | OUTPATIENT
Start: 2022-05-26

## 2022-06-09 ENCOUNTER — HOSPITAL ENCOUNTER (OUTPATIENT)
Dept: MAMMOGRAPHY | Facility: HOSPITAL | Age: 72
Discharge: HOME | End: 2022-06-09
Attending: PHYSICIAN ASSISTANT
Payer: MEDICARE

## 2022-06-09 DIAGNOSIS — Z12.31 SCREENING MAMMOGRAM FOR BREAST CANCER: ICD-10-CM

## 2022-06-09 PROCEDURE — 77063 BREAST TOMOSYNTHESIS BI: CPT | Performed by: PHYSICIAN ASSISTANT

## 2022-06-09 PROCEDURE — 77067 SCR MAMMO BI INCL CAD: CPT | Performed by: PHYSICIAN ASSISTANT

## 2023-05-04 ENCOUNTER — TELEPHONE (OUTPATIENT)
Dept: FAMILY MEDICINE CLINIC | Facility: CLINIC | Age: 73
End: 2023-05-04

## 2023-05-04 DIAGNOSIS — E78.00 PURE HYPERCHOLESTEROLEMIA: ICD-10-CM

## 2023-05-04 DIAGNOSIS — I10 ESSENTIAL HYPERTENSION: Primary | ICD-10-CM

## 2023-05-31 DIAGNOSIS — I10 ESSENTIAL HYPERTENSION: ICD-10-CM

## 2023-05-31 RX ORDER — LISINOPRIL AND HYDROCHLOROTHIAZIDE 20; 12.5 MG/1; MG/1
TABLET ORAL
Qty: 90 TABLET | Refills: 0 | Status: SHIPPED | OUTPATIENT
Start: 2023-05-31 | End: 2023-06-01

## 2023-06-01 ENCOUNTER — LAB ENCOUNTER (OUTPATIENT)
Dept: LAB | Age: 73
End: 2023-06-01
Attending: PHYSICIAN ASSISTANT
Payer: MEDICARE

## 2023-06-01 ENCOUNTER — OFFICE VISIT (OUTPATIENT)
Dept: FAMILY MEDICINE CLINIC | Facility: CLINIC | Age: 73
End: 2023-06-01
Payer: MEDICARE

## 2023-06-01 VITALS
DIASTOLIC BLOOD PRESSURE: 84 MMHG | WEIGHT: 184 LBS | HEART RATE: 90 BPM | RESPIRATION RATE: 16 BRPM | HEIGHT: 63 IN | BODY MASS INDEX: 32.6 KG/M2 | SYSTOLIC BLOOD PRESSURE: 128 MMHG | TEMPERATURE: 97 F

## 2023-06-01 DIAGNOSIS — Z00.00 ENCOUNTER FOR ANNUAL HEALTH EXAMINATION: Primary | ICD-10-CM

## 2023-06-01 DIAGNOSIS — I10 ESSENTIAL HYPERTENSION: ICD-10-CM

## 2023-06-01 DIAGNOSIS — M25.511 CHRONIC PAIN OF BOTH SHOULDERS: ICD-10-CM

## 2023-06-01 DIAGNOSIS — E78.00 PURE HYPERCHOLESTEROLEMIA: ICD-10-CM

## 2023-06-01 DIAGNOSIS — M25.512 CHRONIC PAIN OF BOTH SHOULDERS: ICD-10-CM

## 2023-06-01 DIAGNOSIS — R42 VERTIGO: ICD-10-CM

## 2023-06-01 DIAGNOSIS — G89.29 CHRONIC PAIN OF BOTH SHOULDERS: ICD-10-CM

## 2023-06-01 LAB
ALBUMIN SERPL-MCNC: 3.8 G/DL (ref 3.4–5)
ALBUMIN/GLOB SERPL: 1.1 {RATIO} (ref 1–2)
ALP LIVER SERPL-CCNC: 96 U/L
ALT SERPL-CCNC: 26 U/L
ANION GAP SERPL CALC-SCNC: 3 MMOL/L (ref 0–18)
AST SERPL-CCNC: 22 U/L (ref 15–37)
BASOPHILS # BLD AUTO: 0.11 X10(3) UL (ref 0–0.2)
BASOPHILS NFR BLD AUTO: 1 %
BILIRUB SERPL-MCNC: 0.4 MG/DL (ref 0.1–2)
BUN BLD-MCNC: 27 MG/DL (ref 7–18)
CALCIUM BLD-MCNC: 9.9 MG/DL (ref 8.5–10.1)
CHLORIDE SERPL-SCNC: 107 MMOL/L (ref 98–112)
CHOLEST SERPL-MCNC: 159 MG/DL (ref ?–200)
CO2 SERPL-SCNC: 28 MMOL/L (ref 21–32)
CREAT BLD-MCNC: 0.8 MG/DL
EOSINOPHIL # BLD AUTO: 0.26 X10(3) UL (ref 0–0.7)
EOSINOPHIL NFR BLD AUTO: 2.5 %
ERYTHROCYTE [DISTWIDTH] IN BLOOD BY AUTOMATED COUNT: 12.5 %
FASTING PATIENT LIPID ANSWER: YES
FASTING STATUS PATIENT QL REPORTED: YES
GFR SERPLBLD BASED ON 1.73 SQ M-ARVRAT: 77 ML/MIN/1.73M2 (ref 60–?)
GLOBULIN PLAS-MCNC: 3.6 G/DL (ref 2.8–4.4)
GLUCOSE BLD-MCNC: 96 MG/DL (ref 70–99)
HCT VFR BLD AUTO: 44.4 %
HDLC SERPL-MCNC: 72 MG/DL (ref 40–59)
HGB BLD-MCNC: 14.2 G/DL
IMM GRANULOCYTES # BLD AUTO: 0.03 X10(3) UL (ref 0–1)
IMM GRANULOCYTES NFR BLD: 0.3 %
LDLC SERPL CALC-MCNC: 69 MG/DL (ref ?–100)
LYMPHOCYTES # BLD AUTO: 2.17 X10(3) UL (ref 1–4)
LYMPHOCYTES NFR BLD AUTO: 20.6 %
MCH RBC QN AUTO: 32.3 PG (ref 26–34)
MCHC RBC AUTO-ENTMCNC: 32 G/DL (ref 31–37)
MCV RBC AUTO: 100.9 FL
MONOCYTES # BLD AUTO: 0.9 X10(3) UL (ref 0.1–1)
MONOCYTES NFR BLD AUTO: 8.5 %
NEUTROPHILS # BLD AUTO: 7.06 X10 (3) UL (ref 1.5–7.7)
NEUTROPHILS # BLD AUTO: 7.06 X10(3) UL (ref 1.5–7.7)
NEUTROPHILS NFR BLD AUTO: 67.1 %
NONHDLC SERPL-MCNC: 87 MG/DL (ref ?–130)
OSMOLALITY SERPL CALC.SUM OF ELEC: 291 MOSM/KG (ref 275–295)
PLATELET # BLD AUTO: 326 10(3)UL (ref 150–450)
POTASSIUM SERPL-SCNC: 4.4 MMOL/L (ref 3.5–5.1)
PROT SERPL-MCNC: 7.4 G/DL (ref 6.4–8.2)
RBC # BLD AUTO: 4.4 X10(6)UL
SODIUM SERPL-SCNC: 138 MMOL/L (ref 136–145)
TRIGL SERPL-MCNC: 102 MG/DL (ref 30–149)
TSI SER-ACNC: 0.71 MIU/ML (ref 0.36–3.74)
VLDLC SERPL CALC-MCNC: 15 MG/DL (ref 0–30)
WBC # BLD AUTO: 10.5 X10(3) UL (ref 4–11)

## 2023-06-01 PROCEDURE — 84443 ASSAY THYROID STIM HORMONE: CPT

## 2023-06-01 PROCEDURE — 85025 COMPLETE CBC W/AUTO DIFF WBC: CPT

## 2023-06-01 PROCEDURE — 3074F SYST BP LT 130 MM HG: CPT | Performed by: PHYSICIAN ASSISTANT

## 2023-06-01 PROCEDURE — 1160F RVW MEDS BY RX/DR IN RCRD: CPT | Performed by: PHYSICIAN ASSISTANT

## 2023-06-01 PROCEDURE — G0439 PPPS, SUBSEQ VISIT: HCPCS | Performed by: PHYSICIAN ASSISTANT

## 2023-06-01 PROCEDURE — 36415 COLL VENOUS BLD VENIPUNCTURE: CPT

## 2023-06-01 PROCEDURE — 1159F MED LIST DOCD IN RCRD: CPT | Performed by: PHYSICIAN ASSISTANT

## 2023-06-01 PROCEDURE — 80061 LIPID PANEL: CPT

## 2023-06-01 PROCEDURE — 80053 COMPREHEN METABOLIC PANEL: CPT

## 2023-06-01 PROCEDURE — 96160 PT-FOCUSED HLTH RISK ASSMT: CPT | Performed by: PHYSICIAN ASSISTANT

## 2023-06-01 PROCEDURE — 3079F DIAST BP 80-89 MM HG: CPT | Performed by: PHYSICIAN ASSISTANT

## 2023-06-01 PROCEDURE — 1125F AMNT PAIN NOTED PAIN PRSNT: CPT | Performed by: PHYSICIAN ASSISTANT

## 2023-06-01 PROCEDURE — 3008F BODY MASS INDEX DOCD: CPT | Performed by: PHYSICIAN ASSISTANT

## 2023-06-01 RX ORDER — MECLIZINE HYDROCHLORIDE 25 MG/1
25 TABLET ORAL 2 TIMES DAILY
Qty: 180 TABLET | Refills: 3 | Status: SHIPPED | OUTPATIENT
Start: 2023-06-01

## 2023-06-01 RX ORDER — ATORVASTATIN CALCIUM 20 MG/1
20 TABLET, FILM COATED ORAL NIGHTLY
Qty: 90 TABLET | Refills: 3 | Status: SHIPPED | OUTPATIENT
Start: 2023-06-01

## 2023-06-01 RX ORDER — LISINOPRIL AND HYDROCHLOROTHIAZIDE 20; 12.5 MG/1; MG/1
1 TABLET ORAL DAILY
Qty: 90 TABLET | Refills: 3 | Status: SHIPPED | OUTPATIENT
Start: 2023-06-01

## 2023-06-01 NOTE — PATIENT INSTRUCTIONS
Magnesium glycinate- try 30 min before bed- 300-350 mg. Miriam Alcaraz's SCREENING SCHEDULE   Tests on this list are recommended by your physician but may not be covered, or covered at this frequency, by your insurer. Please check with your insurance carrier before scheduling to verify coverage. PREVENTATIVE SERVICES FREQUENCY &  COVERAGE DETAILS LAST COMPLETION DATE   Diabetes Screening    Fasting Blood Sugar /  Glucose    One screening every 12 months if never tested or if previously tested but not diagnosed with pre-diabetes   One screening every 6 months if diagnosed with pre-diabetes Lab Results   Component Value Date    GLUCOSE 78 12/16/2008    GLU 98 05/26/2022        Cardiovascular Disease Screening    Lipid Panel  Cholesterol  Lipoprotein (HDL)  Triglycerides Covered every 5 years for all Medicare beneficiaries without apparent signs or symptoms of cardiovascular disease Lab Results   Component Value Date    CHOLEST 157 05/26/2022    HDL 70 (H) 05/26/2022    LDL 67 05/26/2022    TRIG 114 05/26/2022         Electrocardiogram (EKG)   Covered if needed at Welcome to Medicare, and non-screening if indicated for medical reasons -      Ultrasound Screening for Abdominal Aortic Aneurysm (AAA) Covered once in a lifetime for one of the following risk factors    Men who are 73-68 years old and have ever smoked    Anyone with a family history -     Colorectal Cancer Screening  Covered for ages 52-80; only need ONE of the following:    Colonoscopy   Covered every 10 years    Covered every 2 years if patient is at high risk or previous colonoscopy was abnormal -    Colorectal Cancer Screening Never done    Flexible Sigmoidoscopy   Covered every 4 years -    Fecal Occult Blood Test Covered annually -   Bone Density Screening    Bone density screening    Covered every 2 years after age 72 if diagnosed with risk of osteoporosis or estrogen deficiency.     Covered yearly for long-term glucocorticoid medication use (Steroids) Last Dexa Scan:    XR DEXA BONE DENSITOMETRY (CPT=77080) 11/05/2020      No recommendations at this time   Pap and Pelvic    Pap   Covered every 2 years for women at normal risk;  Annually if at high risk -  No recommendations at this time    Chlamydia Annually if high risk -  No recommendations at this time   Screening Mammogram    Mammogram     Recommend annually for all female patients aged 36 and older    One baseline mammogram covered for patients aged 32-38 06/09/2022    Mammogram due on 06/09/2023    Immunizations    Influenza Covered once per flu season  Please get every year -  No recommendations at this time    Pneumococcal Each vaccine (Ylcragt52 & Qmfoeziyn24) covered once after 72 Prevnar 13: 05/17/2018    Odasftywv12: 07/11/2019     No recommendations at this time    Hepatitis B One screening covered for patients with certain risk factors   -  No recommendations at this time    Tetanus Toxoid Not covered by Medicare Part B unless medically necessary (cut with metal); may be covered with your pharmacy prescription benefits -    Tetanus, Diptheria and Pertusis TD and TDaP Not covered by Medicare Part B -  No recommendations at this time    Zoster Not covered by Medicare Part B; may be covered with your pharmacy  prescription benefits -  Zoster Vaccines(1 of 2) Never done       Annual Monitoring of Persistent Medications (ACE/ARB, digoxin diuretics, anticonvulsants)    Potassium Annually Lab Results   Component Value Date    K 4.4 05/26/2022         Creatinine   Annually Lab Results   Component Value Date    CREATSERUM 0.75 05/26/2022         BUN Annually Lab Results   Component Value Date    BUN 27 (H) 05/26/2022       Drug Serum Conc Annually No results found for: DIGOXIN, DIG, VALP

## 2023-10-26 ENCOUNTER — TELEPHONE (OUTPATIENT)
Dept: FAMILY MEDICINE CLINIC | Facility: CLINIC | Age: 73
End: 2023-10-26

## 2023-10-26 DIAGNOSIS — Z12.11 COLON CANCER SCREENING: Primary | ICD-10-CM

## 2023-10-30 NOTE — TELEPHONE ENCOUNTER
FIT mail to     [address]  [address]  [address] [address]               Today 10/30/23  12:58 pm

## 2024-06-13 ENCOUNTER — TELEPHONE (OUTPATIENT)
Dept: FAMILY MEDICINE CLINIC | Facility: CLINIC | Age: 74
End: 2024-06-13

## 2024-06-13 DIAGNOSIS — I10 ESSENTIAL HYPERTENSION: ICD-10-CM

## 2024-06-13 DIAGNOSIS — E78.00 PURE HYPERCHOLESTEROLEMIA: Primary | ICD-10-CM

## 2024-06-13 NOTE — TELEPHONE ENCOUNTER
Please enter lab orders for the patient's upcoming physical appointment.     Physical scheduled:   Your appointments       Date & Time Appointment Department ([place])    Jun 20, 2024 1:45 PM CDT MA Supervisit with Mushtaq Knowles NP [facility] ([facility])              [facility]  [address] [place] [address] [place] [id number]  [phone]           Preferred lab: [facility] ([facility])     The patient has been notified to complete fasting labs prior to their physical appointment.

## 2024-06-17 DIAGNOSIS — E78.00 PURE HYPERCHOLESTEROLEMIA: ICD-10-CM

## 2024-06-18 RX ORDER — ATORVASTATIN CALCIUM 20 MG/1
20 TABLET, FILM COATED ORAL NIGHTLY
Qty: 90 TABLET | Refills: 0 | Status: SHIPPED | OUTPATIENT
Start: 2024-06-18 | End: 2024-06-20

## 2024-06-18 NOTE — TELEPHONE ENCOUNTER
Requested Prescriptions     Pending Prescriptions Disp Refills    ATORVASTATIN 20 MG Oral Tab [Pharmacy Med Name: ATORVASTATIN 20 MG TABLET] 90 tablet 3     Sig: TAKE 1 TABLET BY MOUTH EVERY DAY AT NIGHT     LOV Visit date not found     Patient was asked to follow-up in:     Appointment scheduled: 6/20/2024 Mushtaq Knowles NP     Medication refilled per protocol.    
Patent

## 2024-06-20 ENCOUNTER — OFFICE VISIT (OUTPATIENT)
Dept: FAMILY MEDICINE CLINIC | Facility: CLINIC | Age: 74
End: 2024-06-20

## 2024-06-20 ENCOUNTER — TELEPHONE (OUTPATIENT)
Dept: ORTHOPEDICS CLINIC | Facility: CLINIC | Age: 74
End: 2024-06-20

## 2024-06-20 VITALS
WEIGHT: 185.63 LBS | TEMPERATURE: 97 F | DIASTOLIC BLOOD PRESSURE: 80 MMHG | BODY MASS INDEX: 33 KG/M2 | SYSTOLIC BLOOD PRESSURE: 124 MMHG

## 2024-06-20 DIAGNOSIS — G25.81 RLS (RESTLESS LEGS SYNDROME): ICD-10-CM

## 2024-06-20 DIAGNOSIS — M25.512 LEFT SHOULDER PAIN, UNSPECIFIED CHRONICITY: ICD-10-CM

## 2024-06-20 DIAGNOSIS — R42 VERTIGO: ICD-10-CM

## 2024-06-20 DIAGNOSIS — I10 ESSENTIAL HYPERTENSION: ICD-10-CM

## 2024-06-20 DIAGNOSIS — M25.511 RIGHT SHOULDER PAIN, UNSPECIFIED CHRONICITY: Primary | ICD-10-CM

## 2024-06-20 DIAGNOSIS — G89.29 CHRONIC PAIN OF BOTH SHOULDERS: ICD-10-CM

## 2024-06-20 DIAGNOSIS — Z00.00 ENCOUNTER FOR ANNUAL HEALTH EXAMINATION: Primary | ICD-10-CM

## 2024-06-20 DIAGNOSIS — M25.511 CHRONIC PAIN OF BOTH SHOULDERS: ICD-10-CM

## 2024-06-20 DIAGNOSIS — M25.512 CHRONIC PAIN OF BOTH SHOULDERS: ICD-10-CM

## 2024-06-20 DIAGNOSIS — E78.00 PURE HYPERCHOLESTEROLEMIA: ICD-10-CM

## 2024-06-20 RX ORDER — MELOXICAM 7.5 MG/1
7.5 TABLET ORAL DAILY
Qty: 90 TABLET | Refills: 0 | Status: SHIPPED | OUTPATIENT
Start: 2024-06-20

## 2024-06-20 RX ORDER — MECLIZINE HYDROCHLORIDE 25 MG/1
25 TABLET ORAL 2 TIMES DAILY
Qty: 180 TABLET | Refills: 1 | Status: SHIPPED | OUTPATIENT
Start: 2024-06-20

## 2024-06-20 RX ORDER — LISINOPRIL AND HYDROCHLOROTHIAZIDE 20; 12.5 MG/1; MG/1
1 TABLET ORAL DAILY
Qty: 90 TABLET | Refills: 1 | Status: SHIPPED | OUTPATIENT
Start: 2024-06-20

## 2024-06-20 RX ORDER — ATORVASTATIN CALCIUM 20 MG/1
20 TABLET, FILM COATED ORAL NIGHTLY
Qty: 90 TABLET | Refills: 1 | Status: SHIPPED | OUTPATIENT
Start: 2024-06-20

## 2024-06-20 NOTE — PROGRESS NOTES
Subjective:   Becky Alcaraz is a 73 year old female who presents for a MA (Medicare Advantage) Supervisit (Once per calendar year) and scheduled follow up of multiple significant but stable problems and acute exacerbation of a chronic illness.     Continues working at [employer] on [employer] full time.      Has continued complaints of pain to bilateral shoulders effecting movement that has been ongoing issues since early 2020. Reports pain is constant currently L>R. Reports limited movement od shoulders, unable to elevate arms either to her sides or in front of her even to level of shoulder. Denies known injury to shoulders. Denies redness or swelling of shoulders. Has tried home exercises, is unable to afford physical therapy visits. Is unable to tolerate steroids due to possible allergy. Had xray of left shoulder in Feb 2020, ordered by Dr. Zhu, that revealed arthritic changes in shoulder.     Also, reports issues with leg cramps and restless legs occurring at night for approx 2 years. Reports does not occur every night, but getting more often, 1-2 nights per week now. Seems worse after working long shift, where she is on her feet a lot.  Denies redness or swelling of legs. Denies N/T to legs. Reports has had 2 back surgeries in the past./     Has prior diagnosis of HTN/dyslipidemia- Stated taking medications as ordered, w/o reported side effects. Is not checking BP at home. Denies chest pain, palpitations, SOB, TIRADO, headaches, dizziness, lightheadedness, visual changes, muscle aches, joint pain, abd bloating, cramping.    Has prior diagnosis of vertigo- typically manages with meclizine with good control. Reports episodes occur approx 2-3 times per day, reports is taking meclizine BID and feels this is working well.      History/Other:   Fall Risk Assessment:   She has been screened for Falls and is High Risk. Fall Prevention information provided to patient in After Visit Summary.    Do you feel  unsteady when standing or walking?: No  Do you worry about falling?: No  Have you fallen in the past year?: Yes  How many times have you fallen?: 1  Were you injured?: No     Cognitive Assessment:   She had a completely normal cognitive assessment - see flowsheet entries       Functional Ability/Status:   Becky Alcaraz has some abnormal functions as listed below:  She has Walking problems based on screening of functional status. She has problems with Daily Activities based on screening of functional status.       Depression Screening (PHQ-2/PHQ-9): PHQ-9 TOTAL SCORE: 3  , done 6/20/2024   Feeling down, depressed, or hopeless: 1    Trouble falling or staying asleep, or sleeping too much: 1     Feeling tired or having little energy: 1    If you checked off any problems, how difficult have these problems made it for you to do your work, take care of things at home, or get along with other people?: Not difficult at all            Advanced Directives:   She does NOT have a Living Will. [Do you have a living will?: No]  She does NOT have a Power of  for Health Care. [Do you have a healthcare power of ?: No]  Discussed Advance Care Planning with patient (and family/surrogate if present). Standard forms made available to patient in After Visit Summary.      Patient Active Problem List   Diagnosis    Chronic bilateral low back pain without sciatica    Essential hypertension    Pure hypercholesterolemia     Allergies:  She is allergic to cortisone [cortisone acetate].    Current Medications:  Outpatient Medications Marked as Taking for the 6/20/24 encounter (Office Visit) with Mushtaq Knowles NP   Medication Sig    Meloxicam 7.5 MG Oral Tab Take 1 tablet (7.5 mg total) by mouth daily.    meclizine 25 MG Oral Tab Take 1 tablet (25 mg total) by mouth 2 (two) times daily.    lisinopril-hydroCHLOROthiazide 20-12.5 MG Oral Tab Take 1 tablet by mouth daily.    atorvastatin 20 MG Oral Tab Take 1 tablet (20 mg total)  by mouth nightly.    Multiple Vitamins-Minerals (CENTRUM SILVER ULTRA WOMENS) Oral Tab Take 1 tablet by mouth daily.    vitamin A 47230 UNITS Oral Cap Take 1 capsule (10,000 Units total) by mouth daily.    Calcium Carbonate-Vitamin D 600-400 MG-UNIT Oral Tab Take 1 tablet by mouth daily.    Misc Natural Products (OSTEO BI-FLEX TRIPLE STRENGTH) Oral Tab Take 1 tablet by mouth daily.    ascorbic acid, VITAMIN C, 250 MG Oral Tab Take 1 tablet (250 mg total) by mouth daily.    Biotin 5000 MCG Oral Cap Take 1 tablet by mouth daily.    Cyanocobalamin (VITAMIN B 12 OR) Take 100 mcg by mouth daily.       Medical History:  She  has a past medical history of Allergic rhinitis ([employer]), Arthritis (2 or 3 years ago), BACK PAIN, Essential hypertension (Several years now), GERD, HEADACHES, HYPERLIPIDEMIA, Hyperlipidemia (Few years ago), HYPERTENSION, MITRAL VALVE PROLAPSE, Olecranon bursitis of left elbow (10/11/2018), OTHER DISEASES, Stress fracture of left foot with routine healing, subsequent encounter (2017), and Stress reaction of left foot, initial encounter (2017).  Surgical History:  She  has a past surgical history that includes spine surgery procedure unlisted;  (1978); laparoscopic cholecystectomy; d & c (, 1982); hemorrhoidectomy (1992); hernia surgery (1997); unlisted proc, arthroscopy (1996); laminectomy,lumbar; spine surgery procedure unlisted; other surgical history; other surgical history; spine surgery procedure unlisted; forearm/wrist surgery unlisted; appendectomy (92); back surgery (93 and 3/24/00); cholecystectomy (91); hysterectomy (10/18/82); and  (70).   Family History:  Her family history includes Asthma in her son; Cancer in her brother, daughter, and father; Diabetes in her mother; Hypertension in her mother; acute MI in her father; hodgkin's disease in her father.  Social History:  She  reports that she has never  smoked. She has never used smokeless tobacco. She reports current alcohol use. She reports that she does not use drugs.    Tobacco:  She has never smoked tobacco.    CAGE Alcohol Screen:   CAGE screening score of 0 on 6/20/2024, showing low risk of alcohol abuse.      Patient Care Team:  Zoila Balderas MD as PCP - General (Family Practice)  Niki Chaves PT as Physical Therapist (Physical Therapy)  Bear Villalba    Review of Systems  GENERAL: feels well otherwise  SKIN: denies any unusual skin lesions  EYES: denies blurred vision or double vision  HEENT: denies nasal congestion, sinus pain or ST  LUNGS: denies shortness of breath with exertion  CARDIOVASCULAR: denies chest pain on exertion  GI: denies abdominal pain, denies heartburn  : denies dysuria, vaginal discharge or itching, no complaint of urinary incontinenc  NEURO: denies headaches    Objective:   Physical Exam  General Appearance:  Alert, cooperative, no distress, appears stated age   Head:  Normocephalic, without obvious abnormality, atraumatic   Eyes:  PERRL, conjunctiva/corneas clear, EOM's intact both eyes   Ears:  Normal TM's and external ear canals, both ears   Nose: Nares normal, septum midline,mucosa normal, no drainage or sinus tenderness   Throat: Lips, mucosa, and tongue normal; teeth and gums normal   Neck: Supple, symmetrical, trachea midline, no adenopathy;  thyroid: not enlarged, symmetric, no tenderness/mass/nodules; no carotid bruit or JVD   Back:   Symmetric, no curvature, ROM normal, no CVA tenderness   Lungs:   Clear to auscultation bilaterally, respirations unlabored   Heart:  Regular rate and rhythm, S1 and S2 normal, no murmur, rub, or gallop   Abdomen:   Soft, non-tender, bowel sounds active all four quadrants,  no masses, no organomegaly   Pelvic: Deferred   Extremities: Extremities normal, atraumatic, no cyanosis or edema. bilateral shoulders with significant limited ROM to anterior and lateral elevation w/o edema, erythema,  masses, deformity or TTP. Unable to perform drop test or internal rotation test secondary to pain.     Pulses: 2+ and symmetric   Skin: Skin color, texture, turgor normal, no rashes or lesions   Lymph nodes: Cervical, supraclavicular, and axillary nodes normal   Neurologic: Normal       /80   Temp 96.8 °F (36 °C)   Wt 185 lb 9.6 oz (84.2 kg)   BMI 32.88 kg/m²  Estimated body mass index is 32.88 kg/m² as calculated from the following:    Height as of 6/1/23: 5' 3\" (1.6 m).    Weight as of this encounter: 185 lb 9.6 oz (84.2 kg).    Medicare Hearing Assessment:   Hearing Screening    Screening Method: Finger Rub  Finger Rub Result: Pass         Visual Acuity:   Right Eye Visual Acuity: Corrected Right Eye Chart Acuity: 20/30   Left Eye Visual Acuity: Corrected Left Eye Chart Acuity: 20/30   Both Eyes Visual Acuity: Corrected Both Eyes Chart Acuity: 20/25   Able To Tolerate Visual Acuity: Yes        Assessment & Plan:   Becky Alcaraz is a 73 year old female who presents for a Medicare Assessment.     1. Encounter for annual health examination (Primary)- adult female with concerns as below. Discussed appropriate health guidance including importance of remaining active and healthy diet choices. Asked to complete labs as previously ordered.     2. Chronic pain of both shoulders- trial meloxicam. Medication use and side effects discussed. Referred to Dr. Bales for further eval. Patient reports she cannot afford physical therapy.   -     ORTHOPEDIC - INTERNAL  3. RLS (restless legs syndrome)- trial meloxicam as above. If no improvement RTO and will consider ropinirole.   4. Essential hypertension- stable, continue current management, refills provided.   -     Lisinopril-hydroCHLOROthiazide; Take 1 tablet by mouth daily.  Dispense: 90 tablet; Refill: 1  5. Pure hypercholesterolemia- stable, continue current management, refills provided.   -     Atorvastatin Calcium; Take 1 tablet (20 mg total) by mouth nightly.   Dispense: 90 tablet; Refill: 1  6. Vertigo- stable, continue current management, refills provided.   -     Meclizine HCl; Take 1 tablet (25 mg total) by mouth 2 (two) times daily.  Dispense: 180 tablet; Refill: 1  Other orders  -     Meloxicam; Take 1 tablet (7.5 mg total) by mouth daily.  Dispense: 90 tablet; Refill: 0  The patient indicates understanding of these issues and agrees to the plan.  Reinforced healthy diet, lifestyle, and exercise.      No follow-ups on file.     Mushtaq Knowles NP, 6/20/2024     Supplementary Documentation:   General Health:  In the past six months, have you lost more than 10 pounds without trying?: 2 - No  Has your appetite been poor?: No  Type of Diet: Balanced  How does the patient maintain a good energy level?: Other  How would you describe your daily physical activity?: Heavy  How would you describe your current health state?: Good  How do you maintain positive mental well-being?: Social Interaction;Puzzles;Games;Visiting Friends  On a scale of 0 to 10, with 0 being no pain and 10 being severe pain, what is your pain level?: 8 - (Severe)  In the past six months, have you experienced urine leakage?: 0-No  At any time do you feel concerned for the safety/well-being of yourself and/or your children, in your home or elsewhere?: No  Have you had any immunizations at another office such as Influenza, Hepatitis B, Tetanus, or Pneumococcal?: No         Becky Alcaraz's SCREENING SCHEDULE   Tests on this list are recommended by your physician but may not be covered, or covered at this frequency, by your insurer.   Please check with your insurance carrier before scheduling to verify coverage.   PREVENTATIVE SERVICES FREQUENCY &  COVERAGE DETAILS LAST COMPLETION DATE   Diabetes Screening    Fasting Blood Sugar /  Glucose    One screening every 12 months if never tested or if previously tested but not diagnosed with pre-diabetes   One screening every 6 months if diagnosed with pre-diabetes  Lab Results   Component Value Date    GLUCOSE 78 12/16/2008    GLU 96 06/01/2023        Cardiovascular Disease Screening    Lipid Panel  Cholesterol  Lipoprotein (HDL)  Triglycerides Covered every 5 years for all Medicare beneficiaries without apparent signs or symptoms of cardiovascular disease Lab Results   Component Value Date    CHOLEST 159 06/01/2023    HDL 72 (H) 06/01/2023    LDL 69 06/01/2023    TRIG 102 06/01/2023         Electrocardiogram (EKG)   Covered if needed at Welcome to Medicare, and non-screening if indicated for medical reasons -      Ultrasound Screening for Abdominal Aortic Aneurysm (AAA) Covered once in a lifetime for one of the following risk factors    Men who are 65-75 years old and have ever smoked    Anyone with a family history -     Colorectal Cancer Screening  Covered for ages 50-85; only need ONE of the following:    Colonoscopy   Covered every 10 years    Covered every 2 years if patient is at high risk or previous colonoscopy was abnormal -    Health Maintenance   Topic Date Due    Colorectal Cancer Screening  Never done       Flexible Sigmoidoscopy   Covered every 4 years -    Fecal Occult Blood Test Covered annually -   Bone Density Screening    Bone density screening    Covered every 2 years after age 65 if diagnosed with risk of osteoporosis or estrogen deficiency.    Covered yearly for long-term glucocorticoid medication use (Steroids) Last Dexa Scan:    XR DEXA BONE DENSITOMETRY (CPT=77080) 11/05/2020      No recommendations at this time   Pap and Pelvic    Pap   Covered every 2 years for women at normal risk; Annually if at high risk -  No recommendations at this time    Chlamydia Annually if high risk -  No recommendations at this time   Screening Mammogram    Mammogram     Recommend annually for all female patients aged 40 and older    One baseline mammogram covered for patients aged 35-39 06/09/2022    Health Maintenance   Topic Date Due    Mammogram  Discontinued        Immunizations    Influenza Covered once per flu season  Please get every year 12/09/2023  No recommendations at this time    Pneumococcal Each vaccine ([id number] & [id number]) covered once after 65 Prevnar 13: 05/17/2018    [id number]: 07/11/2019     No recommendations at this time    Hepatitis B One screening covered for patients with certain risk factors   -  No recommendations at this time    Tetanus Toxoid Not covered by Medicare Part B unless medically necessary (cut with metal); may be covered with your pharmacy prescription benefits -    Tetanus, Diptheria and Pertusis TD and TDaP Not covered by Medicare Part B -  No recommendations at this time    Zoster Not covered by Medicare Part B; may be covered with your pharmacy  prescription benefits -  Zoster Vaccines(1 of 2) Never done     Annual Monitoring of Persistent Medications (ACE/ARB, digoxin diuretics, anticonvulsants)    Potassium Annually Lab Results   Component Value Date    K 4.4 06/01/2023         Creatinine   Annually Lab Results   Component Value Date    CREATSERUM 0.80 06/01/2023         BUN Annually Lab Results   Component Value Date    BUN 27 (H) 06/01/2023       Drug Serum Conc Annually No results found for: \"DIGOXIN\", \"DIG\", \"VALP\"

## 2024-06-20 NOTE — TELEPHONE ENCOUNTER
Patient is scheduled for MAS[place] shoulder pain. Please advise if imaging is needed.  Future Appointments   Date Time Provider Department Center   7/17/2024  1:40 PM [id number]

## 2024-06-20 NOTE — PATIENT INSTRUCTIONS
Becky Alcaraz's SCREENING SCHEDULE   Tests on this list are recommended by your physician but may not be covered, or covered at this frequency, by your insurer.   Please check with your insurance carrier before scheduling to verify coverage.   PREVENTATIVE SERVICES FREQUENCY &  COVERAGE DETAILS LAST COMPLETION DATE   Diabetes Screening    Fasting Blood Sugar /  Glucose    One screening every 12 months if never tested or if previously tested but not diagnosed with pre-diabetes   One screening every 6 months if diagnosed with pre-diabetes Lab Results   Component Value Date    GLUCOSE 78 12/16/2008    GLU 96 06/01/2023        Cardiovascular Disease Screening    Lipid Panel  Cholesterol  Lipoprotein (HDL)  Triglycerides Covered every 5 years for all Medicare beneficiaries without apparent signs or symptoms of cardiovascular disease Lab Results   Component Value Date    CHOLEST 159 06/01/2023    HDL 72 (H) 06/01/2023    LDL 69 06/01/2023    TRIG 102 06/01/2023         Electrocardiogram (EKG)   Covered if needed at Welcome to Medicare, and non-screening if indicated for medical reasons -      Ultrasound Screening for Abdominal Aortic Aneurysm (AAA) Covered once in a lifetime for one of the following risk factors   • Men who are 65-75 years old and have ever smoked   • Anyone with a family history -     Colorectal Cancer Screening  Covered for ages 50-85; only need ONE of the following:    Colonoscopy   Covered every 10 years    Covered every 2 years if patient is at high risk or previous colonoscopy was abnormal -    Health Maintenance   Topic Date Due   • Colorectal Cancer Screening  Never done       Flexible Sigmoidoscopy   Covered every 4 years -    Fecal Occult Blood Test Covered annually -   Bone Density Screening    Bone density screening    Covered every 2 years after age 65 if diagnosed with risk of osteoporosis or estrogen deficiency.    Covered yearly for long-term glucocorticoid medication use (Steroids) Last  Dexa Scan:    XR DEXA BONE DENSITOMETRY (CPT=77080) 11/05/2020      No recommendations at this time   Pap and Pelvic    Pap   Covered every 2 years for women at normal risk; Annually if at high risk -  No recommendations at this time    Chlamydia Annually if high risk -  No recommendations at this time   Screening Mammogram    Mammogram     Recommend annually for all female patients aged 40 and older    One baseline mammogram covered for patients aged 35-39 06/09/2022    Health Maintenance   Topic Date Due   • Mammogram  Discontinued       Immunizations    Influenza Covered once per flu season  Please get every year 12/09/2023  No recommendations at this time    Pneumococcal Each vaccine ([id number] & [id number]) covered once after 65 Prevnar 13: 05/17/2018    [id number]: 07/11/2019     No recommendations at this time    Hepatitis B One screening covered for patients with certain risk factors   -  No recommendations at this time    Tetanus Toxoid Not covered by Medicare Part B unless medically necessary (cut with metal); may be covered with your pharmacy prescription benefits -    Tetanus, Diptheria and Pertusis TD and TDaP Not covered by Medicare Part B -  No recommendations at this time    Zoster Not covered by Medicare Part B; may be covered with your pharmacy  prescription benefits -  Zoster Vaccines(1 of 2) Never done     Annual Monitoring of Persistent Medications (ACE/ARB, digoxin diuretics, anticonvulsants)    Potassium Annually Lab Results   Component Value Date    K 4.4 06/01/2023         Creatinine   Annually Lab Results   Component Value Date    CREATSERUM 0.80 06/01/2023         BUN Annually Lab Results   Component Value Date    BUN 27 (H) 06/01/2023       Drug Serum Conc Annually No results found for: \"DIGOXIN\", \"DIG\", \"VALP\"

## 2024-06-27 ENCOUNTER — LAB ENCOUNTER (OUTPATIENT)
Dept: LAB | Facility: HOSPITAL | Age: 74
End: 2024-06-27
Attending: ORTHOPAEDIC SURGERY

## 2024-06-27 ENCOUNTER — HOSPITAL ENCOUNTER (OUTPATIENT)
Dept: GENERAL RADIOLOGY | Facility: HOSPITAL | Age: 74
Discharge: HOME | End: 2024-06-27
Attending: ORTHOPAEDIC SURGERY

## 2024-06-27 DIAGNOSIS — M25.512 LEFT SHOULDER PAIN, UNSPECIFIED CHRONICITY: ICD-10-CM

## 2024-06-27 DIAGNOSIS — I10 ESSENTIAL HYPERTENSION: ICD-10-CM

## 2024-06-27 DIAGNOSIS — E78.00 PURE HYPERCHOLESTEROLEMIA: ICD-10-CM

## 2024-06-27 DIAGNOSIS — M25.511 RIGHT SHOULDER PAIN, UNSPECIFIED CHRONICITY: ICD-10-CM

## 2024-06-27 LAB
ALBUMIN SERPL-MCNC: 3.3 G/DL (ref 3.4–5)
ALBUMIN/GLOB SERPL: 1 {RATIO} (ref 1–2)
ALP LIVER SERPL-CCNC: 102 U/L
ALT SERPL-CCNC: 19 U/L
ANION GAP SERPL CALC-SCNC: 3 MMOL/L (ref 0–18)
AST SERPL-CCNC: 13 U/L (ref 15–37)
BASOPHILS # BLD AUTO: 0.13 X10(3) UL (ref 0–0.2)
BASOPHILS NFR BLD AUTO: 1.7 %
BILIRUB SERPL-MCNC: 0.4 MG/DL (ref 0.1–2)
BUN BLD-MCNC: 24 MG/DL (ref 9–23)
CALCIUM BLD-MCNC: 9.3 MG/DL (ref 8.5–10.1)
CHLORIDE SERPL-SCNC: 107 MMOL/L (ref 98–112)
CHOLEST SERPL-MCNC: 146 MG/DL (ref ?–200)
CO2 SERPL-SCNC: 30 MMOL/L (ref 21–32)
CREAT BLD-MCNC: 0.79 MG/DL
EGFRCR SERPLBLD CKD-EPI 2021: 78 ML/MIN/1.73M2 (ref 60–?)
EOSINOPHIL # BLD AUTO: 0.35 X10(3) UL (ref 0–0.7)
EOSINOPHIL NFR BLD AUTO: 4.6 %
ERYTHROCYTE [DISTWIDTH] IN BLOOD BY AUTOMATED COUNT: 12.3 %
FASTING PATIENT LIPID ANSWER: YES
FASTING STATUS PATIENT QL REPORTED: YES
GLOBULIN PLAS-MCNC: 3.4 G/DL (ref 2.8–4.4)
GLUCOSE BLD-MCNC: 98 MG/DL (ref 70–99)
HCT VFR BLD AUTO: 39.7 %
HDLC SERPL-MCNC: 63 MG/DL (ref 40–59)
HGB BLD-MCNC: 13.1 G/DL
IMM GRANULOCYTES # BLD AUTO: 0.03 X10(3) UL (ref 0–1)
IMM GRANULOCYTES NFR BLD: 0.4 %
LDLC SERPL CALC-MCNC: 70 MG/DL (ref ?–100)
LYMPHOCYTES # BLD AUTO: 1.82 X10(3) UL (ref 1–4)
LYMPHOCYTES NFR BLD AUTO: 24.1 %
MCH RBC QN AUTO: 32.7 PG (ref 26–34)
MCHC RBC AUTO-ENTMCNC: 33 G/DL (ref 31–37)
MCV RBC AUTO: 99 FL
MONOCYTES # BLD AUTO: 0.69 X10(3) UL (ref 0.1–1)
MONOCYTES NFR BLD AUTO: 9.1 %
NEUTROPHILS # BLD AUTO: 4.54 X10 (3) UL (ref 1.5–7.7)
NEUTROPHILS # BLD AUTO: 4.54 X10(3) UL (ref 1.5–7.7)
NEUTROPHILS NFR BLD AUTO: 60.1 %
NONHDLC SERPL-MCNC: 83 MG/DL (ref ?–130)
OSMOLALITY SERPL CALC.SUM OF ELEC: 294 MOSM/KG (ref 275–295)
PLATELET # BLD AUTO: 345 10(3)UL (ref 150–450)
POTASSIUM SERPL-SCNC: 5 MMOL/L (ref 3.5–5.1)
PROT SERPL-MCNC: 6.7 G/DL (ref 6.4–8.2)
RBC # BLD AUTO: 4.01 X10(6)UL
SODIUM SERPL-SCNC: 140 MMOL/L (ref 136–145)
TRIGL SERPL-MCNC: 65 MG/DL (ref 30–149)
TSI SER-ACNC: 0.71 MIU/ML (ref 0.36–3.74)
VLDLC SERPL CALC-MCNC: 10 MG/DL (ref 0–30)
WBC # BLD AUTO: 7.6 X10(3) UL (ref 4–11)

## 2024-06-27 PROCEDURE — 85025 COMPLETE CBC W/AUTO DIFF WBC: CPT

## 2024-06-27 PROCEDURE — 80053 COMPREHEN METABOLIC PANEL: CPT

## 2024-06-27 PROCEDURE — 73030 X-RAY EXAM OF SHOULDER: CPT | Performed by: ORTHOPAEDIC SURGERY

## 2024-06-27 PROCEDURE — 80061 LIPID PANEL: CPT

## 2024-06-27 PROCEDURE — 84443 ASSAY THYROID STIM HORMONE: CPT

## 2024-06-27 PROCEDURE — 36415 COLL VENOUS BLD VENIPUNCTURE: CPT

## 2024-07-17 ENCOUNTER — OFFICE VISIT (OUTPATIENT)
Dept: ORTHOPEDICS CLINIC | Facility: CLINIC | Age: 74
End: 2024-07-17
Payer: MEDICARE

## 2024-07-17 VITALS — WEIGHT: 185 LBS | BODY MASS INDEX: 30.82 KG/M2 | HEIGHT: 65 IN

## 2024-07-17 DIAGNOSIS — M19.019 GLENOHUMERAL ARTHRITIS: Primary | ICD-10-CM

## 2024-07-17 PROCEDURE — 1125F AMNT PAIN NOTED PAIN PRSNT: CPT | Performed by: ORTHOPAEDIC SURGERY

## 2024-07-17 PROCEDURE — 1159F MED LIST DOCD IN RCRD: CPT | Performed by: ORTHOPAEDIC SURGERY

## 2024-07-17 PROCEDURE — 1160F RVW MEDS BY RX/DR IN RCRD: CPT | Performed by: ORTHOPAEDIC SURGERY

## 2024-07-17 PROCEDURE — 3008F BODY MASS INDEX DOCD: CPT | Performed by: ORTHOPAEDIC SURGERY

## 2024-07-17 PROCEDURE — 99204 OFFICE O/P NEW MOD 45 MIN: CPT | Performed by: ORTHOPAEDIC SURGERY

## 2024-07-17 NOTE — H&P
Orthopaedic Surgery  [address] [place] [address]  [phone]     NEW PATIENT VISIT - HISTORY AND PHYSICAL EXAMINATION     Name: Becky Alcaraz   MRN: [id number]  Date: 2024     CC: Bilateral shoulder pain    REFERRED BY: Zoila Balderas MD    HPI:   Becky Alcaraz is a very pleasant 73 year old right-hand dominant female who presents today for evaluation of bilateral shoulder pain, left is more bothersome than the right. This has been ongoing since  and has been gradually progressing. Pain is 10/10 stiffness, locking and weakness. This improves with rest and worsens with activity.    She has an allergy corticosteroid.     She enjoys mini golfing. Works as a  at [employer].    PMH:   Past Medical History:    Allergic rhinitis    Arthritis    BACK PAIN    Essential hypertension    GERD    HEADACHES    HYPERLIPIDEMIA    Hyperlipidemia    HYPERTENSION    MITRAL VALVE PROLAPSE    Olecranon bursitis of left elbow    OTHER DISEASES    Hx of syncope and Virtigo    Stress fracture of left foot with routine healing, subsequent encounter    Stress reaction of left foot, initial encounter       PAST SURGICAL HX:  Past Surgical History:   Procedure Laterality Date    Appendectomy  92    Back surgery  93 and 3/24/00      1978    Cholecystectomy  91    D & c  , 7/4/1982    x2    Forearm/wrist surgery unlisted      x2 wrist    Hemorrhoidectomy  1992    Hernia surgery  1997    Hysterectomy  10/18/82    Laminectomy,lumbar      Laparoscopic cholecystectomy        70    Other surgical history      neuroplasty w/ decompress median nerve at carpal tunnel    Other surgical history      neuroplasty w/ transposit of ularnerve at elbow    Spine surgery procedure unlisted      cervical vertebral fusion    Spine surgery procedure unlisted      lumbar vertebral fusion    Spine surgery procedure unlisted      diskectomy cervical    Unlisted proc, arthroscopy   01/01/1996    L knee arthro       FAMILY HX:  Family History   Problem Relation Age of Onset    Cancer Daughter         Acute Lymphcytic Leukemia    Diabetes Mother     Hypertension Mother     Cancer Brother         Colon    Other (acute MI) Father     Other (hodgkin's disease) Father     Cancer Father     Asthma Son     Heart Disorder Neg     Lipids Neg     Obesity Neg     Psychiatric Neg        ALLERGIES:  Cortisone [cortisone acetate]    MEDICATIONS:   Current Outpatient Medications   Medication Sig Dispense Refill    Meloxicam 7.5 MG Oral Tab Take 1 tablet (7.5 mg total) by mouth daily. 90 tablet 0    meclizine 25 MG Oral Tab Take 1 tablet (25 mg total) by mouth 2 (two) times daily. 180 tablet 1    lisinopril-hydroCHLOROthiazide 20-12.5 MG Oral Tab Take 1 tablet by mouth daily. 90 tablet 1    atorvastatin 20 MG Oral Tab Take 1 tablet (20 mg total) by mouth nightly. 90 tablet 1    Multiple Vitamins-Minerals (CENTRUM SILVER ULTRA WOMENS) Oral Tab Take 1 tablet by mouth daily.      vitamin A 29113 UNITS Oral Cap Take 1 capsule (10,000 Units total) by mouth daily.      Calcium Carbonate-Vitamin D 600-400 MG-UNIT Oral Tab Take 1 tablet by mouth daily.      Misc Natural Products (OSTEO BI-FLEX TRIPLE STRENGTH) Oral Tab Take 1 tablet by mouth daily.      ascorbic acid, VITAMIN C, 250 MG Oral Tab Take 1 tablet (250 mg total) by mouth daily.      Biotin 5000 MCG Oral Cap Take 1 tablet by mouth daily.      Cyanocobalamin (VITAMIN B 12 OR) Take 100 mcg by mouth daily.         ROS: A comprehensive 14 point review of systems was performed and was negative aside from the aforementioned per history of present illness.    SOCIAL HX:  Social History     Tobacco Use    Smoking status: Never    Smokeless tobacco: Never   Substance Use Topics    Alcohol use: Yes     Comment: Only occasionally       PE:   Vitals:    07/17/24 1355   Weight: 185 lb   Height: 5' 5\" (1.651 m)     Estimated body mass index is 30.79 kg/m² as calculated  from the following:    Height as of this encounter: 5' 5\" (1.651 m).    Weight as of this encounter: 185 lb.    Physical Exam  Constitutional:       Appearance: Normal appearance.   HENT:      Head: Normocephalic and atraumatic.   Eyes:      Extraocular Movements: Extraocular movements intact.   Neck:      Musculoskeletal: Normal range of motion and neck supple.   Cardiovascular:      Pulses: Normal pulses.   Pulmonary:      Effort: Pulmonary effort is normal. No respiratory distress.   Abdominal:      General: There is no distension.   Skin:     General: Skin is warm.      Capillary Refill: Capillary refill takes less than 2 seconds.      Findings: No bruising.   Neurological:      General: No focal deficit present.      Mental Status: Alert.   Psychiatric:         Mood and Affect: Mood normal.     Examination of the shoulders demonstrates:   Skin is intact, warm and dry.   Cervical:  Full ROM  Spurling's  Negative    Deformity:   none  Atrophy:   none    Scapular winging: Negative    Palpation:     AC Joint   Negative  Biceps Tendon  Negative  Greater Tuberosity Negative    ROM:   Forward Flexion:  90°   Abduction:   full and symmetric  External Rotation:  45 degrees  Internal Rotation:  thoracolumbar junction    Rotator Cuff Strength:   Supraspinatus:   5-/5  Subscapularis:   5/5  Infraspinatus/Teres: 5-/5    Provocative Tests:   Mehta:   Negative  Speed's:   Negative  [place]'s:   Positive  Lift-off:    Negative  Apprehension:  Negative  Sulcus Sign:   Negative    Neurovascular Upper Extremity (Bilateral)  Motor:    5/5 EPL, Finger Abduction, , Pinch, Deltoid  Sensation:   intact to light touch median, ulnar, radial and axillary nerve  Circulation:   Normal, 2+ radial pulse    The contralateral upper extremity is without limitation in range of motion or strength, no positive provocative maneuvers.       Radiographic Examination/Diagnostics:    Shoulder XR personally viewed, independently interpreted and  radiology report was reviewed.    XR SHOULDER, COMPLETE (MIN 2 VIEWS), LEFT (CPT=73030)    Result Date: 6/27/2024  PROCEDURE:  XR SHOULDER, COMPLETE (MIN 2 VIEWS), LEFT (CPT=73030)  TECHNIQUE:  Multiple views were obtained.  COMPARISON:  None.  INDICATIONS:  M25.512 Left shoulder pain, unspecified chronicity  PATIENT STATED HISTORY: (As transcribed by Technologist)  Patient complains of bilateral shoulder pain for a couple years. Pain sometimes radiates to her back and neck. Patient also shares she cannot raise both arms above her shoulders. Patient denies any recent injury.              CONCLUSION:  AC joint hypertrophy with slight undersurface spurring.  Glenohumeral joint space narrowing with hypertrophic changes arising off the inferior humeral head.  There is subchondral sclerosis and cystic degenerative change involving the glenohumeral joint.  No acute fracture or dislocation.   LOCATION:  [facility]   Dictated by (CST): Nay Calderon MD on 6/27/2024 at 11:40 AM     Finalized by (CST): Nay Calderon MD on 6/27/2024 at 11:41 AM       XR SHOULDER, COMPLETE (MIN 2 VIEWS), RIGHT (CPT=73030)    Result Date: 6/27/2024  PROCEDURE:  XR SHOULDER, COMPLETE (MIN 2 VIEWS), RIGHT (CPT=73030)  TECHNIQUE:  Multiple views were obtained.  COMPARISON:  None.  INDICATIONS:  M25.511 Right shoulder pain, unspecified chronicity  PATIENT STATED HISTORY: (As transcribed by Technologist)  Patient complains of bilateral shoulder pain for a couple years. Pain sometimes radiates to her back and neck. Patient also shares she cannot raise both arms above her shoulders. Patient denies any recent injury.               CONCLUSION:  AC joint hypertrophy with undersurface spurring of the acromion.  There is joint space narrowing with subchondral sclerosis and cystic degenerative change involving the right glenohumeral joint.  There is a large osteophyte arising off the inferior right humeral head.  Flattening of the right humeral head.  No acute  fracture or dislocation right shoulder.   LOCATION:  [facility]   Dictated by (CST): Nay Calderon MD on 6/27/2024 at 11:39 AM     Finalized by (CST): Nay Calderon MD on 6/27/2024 at 11:40 AM       Independent review: Advanced end stage shoulder OA with inferior based osteophyte. Left more advanced than right.       IMPRESSION: Becky Alcaraz is a 73 year old Right hand dominant female with bilateral glenohumeral arthritis symptomatic since 2020 and gradually worsening.     Patient is an appropriate candidate for a reverse shoulder arthroplasty. A CT scan was ordered for surgical planning.     At this time, she will defer surgery for further deliberation and will return when she ready to proceed.    PLAN:   We had a detailed discussion outlining the etiology, anatomy, pathophysiology, and natural history of patient's findings. Imaging was reviewed in detail and correlated to a 3-dimensional model of the shoulder.     I had a lengthy discussion with Becky about the diagnosis and options, both surgical and nonsurgical. I have recommended that we proceed with a shoulder replacement and biceps tenodesis as we agree surgical intervention would likely offer the best opportunity for symptomatic relief and functional recovery. I used diagrams, imaging studies, and a 3-dimensional model to outline her pathology, as well as general surgical principles. We reviewed the risks associated with shoulder replacement.   I elected to order a CT scan of the shoulder to further characterize internal derangement and plan for surgery, I will plan to follow-up with the patient after completion of the CT scan.     FOLLOW-UP:   Return to clinic after CT completion.       Reji Bales MD  Knee, Shoulder, & Elbow Surgery / Sports Medicine Specialist  Orthopaedic Surgery  [address] [place] [address] [address] [place] [address].org  [email or web address]  t: [phone]  o: [phone]  f: [phone]    This note was dictated using  Dragon software.  While it was briefly proofread prior to completion, some grammatical, spelling, and word choice errors due to dictation may still occur.

## 2024-08-01 ENCOUNTER — HOSPITAL ENCOUNTER (OUTPATIENT)
Dept: CT IMAGING | Facility: HOSPITAL | Age: 74
Discharge: HOME | End: 2024-08-01
Attending: ORTHOPAEDIC SURGERY
Payer: MEDICARE

## 2024-08-01 DIAGNOSIS — M19.019 GLENOHUMERAL ARTHRITIS: ICD-10-CM

## 2024-08-01 PROCEDURE — 73200 CT UPPER EXTREMITY W/O DYE: CPT | Performed by: ORTHOPAEDIC SURGERY

## 2024-08-26 ENCOUNTER — TELEPHONE (OUTPATIENT)
Dept: ORTHOPEDICS CLINIC | Facility: CLINIC | Age: 74
End: 2024-08-26

## 2024-08-26 ENCOUNTER — OFFICE VISIT (OUTPATIENT)
Dept: ORTHOPEDICS CLINIC | Facility: CLINIC | Age: 74
End: 2024-08-26
Payer: MEDICARE

## 2024-08-26 DIAGNOSIS — M19.019 GLENOHUMERAL ARTHRITIS: Primary | ICD-10-CM

## 2024-08-26 DIAGNOSIS — M75.22 BICEPS TENDINITIS OF LEFT UPPER EXTREMITY: ICD-10-CM

## 2024-08-26 PROCEDURE — 1160F RVW MEDS BY RX/DR IN RCRD: CPT | Performed by: ORTHOPAEDIC SURGERY

## 2024-08-26 PROCEDURE — 1159F MED LIST DOCD IN RCRD: CPT | Performed by: ORTHOPAEDIC SURGERY

## 2024-08-26 PROCEDURE — 99215 OFFICE O/P EST HI 40 MIN: CPT | Performed by: ORTHOPAEDIC SURGERY

## 2024-08-26 PROCEDURE — G2212 PROLONG OUTPT/OFFICE VIS: HCPCS | Performed by: ORTHOPAEDIC SURGERY

## 2024-08-26 NOTE — H&P
Orthopaedic Surgery  [address] [place] [address]  [phone]     PRE SURGICAL - HISTORY AND PHYSICAL EXAMINATION     Name: Becky Alcaraz   MRN: [id number]  Date: 2024     CC: Left shoulder pain and weakness in the setting of osteoarthritis     HPI:   Becky Alcaraz is a very pleasant 74 year old right-hand dominant female who presents today for evaluation of advanced imaging of the shoulder and discussion regarding definitive management plan.     To summarize: bilateral shoulder pain, left is more bothersome than the right. This has been ongoing since  and has been gradually progressing. Pain is 10/10 stiffness, locking and weakness. This improves with rest and worsens with activity.     She has an allergy - corticosteroid.      She enjoys mini golfing. Works as a  at [employer].    PMH:   Past Medical History:    Allergic rhinitis    Arthritis    BACK PAIN    Essential hypertension    GERD    HEADACHES    HYPERLIPIDEMIA    Hyperlipidemia    HYPERTENSION    MITRAL VALVE PROLAPSE    Olecranon bursitis of left elbow    OTHER DISEASES    Hx of syncope and Virtigo    Stress fracture of left foot with routine healing, subsequent encounter    Stress reaction of left foot, initial encounter       PAST SURGICAL HX:  Past Surgical History:   Procedure Laterality Date    Appendectomy  92    Back surgery  93 and 3/24/00      1978    Cholecystectomy  91    D & c  , 7/4/1982    x2    Forearm/wrist surgery unlisted      x2 wrist    Hemorrhoidectomy  1992    Hernia surgery  1997    Hysterectomy  10/18/82    Laminectomy,lumbar      Laparoscopic cholecystectomy        70    Other surgical history      neuroplasty w/ decompress median nerve at carpal tunnel    Other surgical history      neuroplasty w/ transposit of ularnerve at elbow    Spine surgery procedure unlisted      cervical vertebral fusion    Spine surgery procedure unlisted      lumbar  vertebral fusion    Spine surgery procedure unlisted      diskectomy cervical    Unlisted proc, arthroscopy  01/01/1996    L knee arthro       FAMILY HX:  Family History   Problem Relation Age of Onset    Cancer Daughter         Acute Lymphcytic Leukemia    Diabetes Mother     Hypertension Mother     Cancer Brother         Colon    Other (acute MI) Father     Other (hodgkin's disease) Father     Cancer Father     Asthma Son     Heart Disorder Neg     Lipids Neg     Obesity Neg     Psychiatric Neg        ALLERGIES:  Cortisone [cortisone acetate]    MEDICATIONS:   Current Outpatient Medications   Medication Sig Dispense Refill    Meloxicam 7.5 MG Oral Tab Take 1 tablet (7.5 mg total) by mouth daily. 90 tablet 0    meclizine 25 MG Oral Tab Take 1 tablet (25 mg total) by mouth 2 (two) times daily. 180 tablet 1    lisinopril-hydroCHLOROthiazide 20-12.5 MG Oral Tab Take 1 tablet by mouth daily. 90 tablet 1    atorvastatin 20 MG Oral Tab Take 1 tablet (20 mg total) by mouth nightly. 90 tablet 1    Multiple Vitamins-Minerals (CENTRUM SILVER ULTRA WOMENS) Oral Tab Take 1 tablet by mouth daily.      vitamin A 67828 UNITS Oral Cap Take 1 capsule (10,000 Units total) by mouth daily.      Calcium Carbonate-Vitamin D 600-400 MG-UNIT Oral Tab Take 1 tablet by mouth daily.      Misc Natural Products (OSTEO BI-FLEX TRIPLE STRENGTH) Oral Tab Take 1 tablet by mouth daily.      ascorbic acid, VITAMIN C, 250 MG Oral Tab Take 1 tablet (250 mg total) by mouth daily.      Biotin 5000 MCG Oral Cap Take 1 tablet by mouth daily.      Cyanocobalamin (VITAMIN B 12 OR) Take 100 mcg by mouth daily.         ROS: A comprehensive 14 point review of systems was performed and was negative aside from the aforementioned per history of present illness.    SOCIAL HX:  Social History     Tobacco Use    Smoking status: Never    Smokeless tobacco: Never   Substance Use Topics    Alcohol use: Yes     Comment: Only occasionally       PE:   There were no vitals  filed for this visit.  Estimated body mass index is 30.79 kg/m² as calculated from the following:    Height as of 7/17/24: 5' 5\" (1.651 m).    Weight as of 7/17/24: 185 lb.    Physical Exam  Constitutional:       Appearance: Normal appearance.   HENT:      Head: Normocephalic and atraumatic.   Eyes:      Extraocular Movements: Extraocular movements intact.   Neck:      Musculoskeletal: Normal range of motion and neck supple.   Cardiovascular:      Pulses: Normal pulses.   Pulmonary:      Effort: Pulmonary effort is normal. No respiratory distress.   Abdominal:      General: There is no distension.   Skin:     General: Skin is warm.      Capillary Refill: Capillary refill takes less than 2 seconds.      Findings: No bruising.   Neurological:      General: No focal deficit present.      Mental Status: Alert.   Psychiatric:         Mood and Affect: Mood normal.     Examination of the left shoulder demonstrates:     Skin is intact, warm and dry.   Cervical:  Full ROM  Spurling's  Negative    Deformity:   none  Atrophy:   none    Scapular winging: Negative    Palpation:     AC Joint   Negative  Biceps Tendon  Negative  Greater Tuberosity Negative    ROM:   Forward Flexion:  90°  Abduction:   full and symmetric  External Rotation:  45  Internal Rotation:  thoracolumbar junction    Rotator Cuff Strength:   Supraspinatus:   5-/5  Subscapularis:   5/5  Infraspinatus/Teres: 5-/5    Provocative Tests:   Mehta:   Negative  Speed's:   Negative  [place]'s:   Negative  Lift-off:    Negative    Neurovascular Upper Extremity (Bilateral)  Motor:    5/5 EPL, Finger Abduction, , Pinch, Deltoid  Sensation:   intact to light touch median, ulnar, radial and axillary nerve  Circulation:   Normal, 2+ radial pulse    The contralateral upper extremity is without limitation in range of motion or strength, no positive provocative maneuvers.     Radiographic Examination/Diagnostics:    CT of the shoulder personally viewed, independently  interpreted and radiology report was reviewed.    CT SHOULDER LEFT (CPT=73200)    Result Date: 8/2/2024  PROCEDURE:  CT SHOULDER LEFT (CPT=73200)  COMPARISON:  [facility] , XR, XR SHOULDER, COMPLETE (MIN 2 VIEWS), LEFT (CPT=73030), 6/27/2024, 10:08 AM.  INDICATIONS:  Severe left shoulder pain with history of osteoarthritis.  TECHNIQUE:  Multi-planar CT images were created without intravenous contrast. Shaded surface renderings are generated on an independent CT scanner workstation.  Dose reduction techniques were used. Dose information is transmitted to the ACR (American College of Radiology) NRDR (National Radiology Data Registry) which includes the Dose Index Registry  3-D RENDERING:  Not applicable  PATIENT STATED HISTORY:(As transcribed by Technologist)  Severe left shoulder pain with limited mobility of left arm and shoulder.    FINDINGS:  No evidence of acute osseous injuries.  There is severe left glenohumeral joint osteoarthritis with posterior subluxation of the humeral head relative to the glenoid.  There is mild to moderate left AC joint osteoarthritis.  There are at least 2 ossified  intra-articular bodies within the subscapular recess, the larger of which measures approximately 9 mm. There is a mild to moderate left glenohumeral joint effusion with extensive fluid distension of the biceps tendon sheath.  No soft tissue swelling or soft tissue mass lesions noted.  The visualized aspects of the left lung are clear.            CONCLUSION:    1. Severe left glenohumeral joint osteoarthritis with posterior subluxation of the humeral head.  Associated joint effusion and at least 2 ossified intra-articular bodies of the subscapular recess as described above.   2. Mild to moderate left AC joint osteoarthritis.   3. Please see the body of the report above for further details.     LOCATION:  [facility]   Dictated by (CST): Burton Meier DO on 8/02/2024 at 7:44 AM     Finalized by (VICTOR MANUEL): Burton Meier DO on 8/02/2024 at  7:48 AM         IMPRESSION: Becky Alcaraz is a 74 year old female with Left shoulder osteoarthritis with insufficient rotator cuff function.     The patient has failed an extensive course of nonsurgical conservative management including intra-articular injections, physical therapy and activity modifications with continued impact on functional capabilities and quality of life. They are an appropriate candidate to proceed with reverse shoulder arthroplasty and biceps tenodesis.     PLAN:   We had a detailed discussion outlining the etiology, anatomy, pathophysiology, and natural history of glenohumeral osteoarthritis pathology. Imaging was reviewed in detail and correlated to a 3-dimensional model of the shoulder.     I had a lengthy discussion with Becky about the diagnosis and options, both surgical and nonsurgical. I have recommended that we proceed with reverse shoulder replacement and biceps tenodesis as we agree surgical intervention would likely offer the best opportunity for symptomatic relief and functional recovery. I used diagrams, imaging studies, and a 3-dimensional model to outline her pathology, as well as general surgical principles. We reviewed the risks associated with shoulder replacement.   In particular we discussed risks that include, but are not limited to infection, blood loss, potential transient or permanent injury to nerves or blood vessels, joint stiffness, persistent pain, need for future operation, failure of healing, wound complications, failure of therapeutic intervention, risk of re-injury, fixation failure, deep vein thrombosis and pulmonary embolism. We discussed the proposed rehabilitation timeline as well as expected postoperative restrictions.     Most post-surgical patients after shoulder replacement utilize a shoulder immobilizer/sling for approximately 4 weeks.  Physical therapy is initiated immediately postsurgically with initial passive range of motion, followed by active  assisted range of motion, and ultimately active range of motion after the 6 to 8-week anna.  After the 3-month anna postsurgically the restrictions are lifted and continued strengthening is recommended.    Becky voiced a good understanding of treatment options, risks and benefits, postoperative instructions, rehabilitation timeline, and restrictions. She was given the opportunity to ask questions, which were all answered to the best of my ability and to her satisfaction. Becky will work with my office to arrange a time for surgery and obtain any medical clearance information necessary. My pre-operative information packet, which details the process and answers many FAQ's will be provided. She was encouraged to call the office with any further questions or concerns.  I spent 60 minutes in preparation to see the patient, counseling/education of relevant pathology, discussing imaging results, surgical counseling, DME fitting, and care coordination.      FOLLOW-UP:   Post-Operative Visit, POD 6 with TEAGAN Orellana MD  Knee, Shoulder, & Elbow Surgery / Sports Medicine Specialist  Orthopaedic Surgery  [address] [place] [address].org  [email or web address]  t: [phone]  o: [phone]  f: [phone]    This note was dictated using Dragon software.  While it was briefly proofread prior to completion, some grammatical, spelling, and word choice errors due to dictation may still occur.

## 2024-08-26 NOTE — TELEPHONE ENCOUNTER
Date of Surgery: 10/15/2024       Post Op Appt:  10/21/2024 1040AM    Case ID: [id number]     Notes: 10/15 - thanks!             OR BOOKING SHEET SHOULDER  Location: [x] [facility]                    [] [facility]  Name: Becky Alcaraz  MRN: [id number]   : 1950  Diagnosis:  [x] Glenohumeral arthritis [M19.019]  Disposition:    [] Ambulatory  [] Overnight for LELO  [x] Overnight for observation and pain control  [] Inpatient procedure     Operative Time Required:  3 HOURS  Antibiotics: 2 g cefazolin within 60 minutes of surgical incision  Procedure:   Laterality:                  [] RIGHT                  [x] LEFT                   [] BILATERAL  Procedures:  [] Total Shoulder Arthroplasty (27061)   [x] Reverse Shoulder Arthroplasty (64838)   [x] Biceps Tenodesis (89383)     Additional info:   [x] PCP Clearance Needed  [x] MRSA  [] C-Arm  [x] TXA at time surgery  [x] Physical Therapy Internal - Monse Rendon  [x] DME Rx Needed  [x] Notify Greg García  [] Appt with Dr. Bales needed  Implants needed: Ricky  Positioning Equipment: Beach Chair Setup, [place]

## 2024-08-26 NOTE — PROGRESS NOTES
OR BOOKING SHEET SHOULDER  Location: [x] [facility]   [] [facility]  Name: Becky Alcaraz  MRN: [id number]   : 1950  Diagnosis:  [x] Glenohumeral arthritis [M19.019]  Disposition:    [] Ambulatory  [] Overnight for LELO  [x] Overnight for observation and pain control  [] Inpatient procedure    Operative Time Required:  3 HOURS  Antibiotics: 2 g cefazolin within 60 minutes of surgical incision  Procedure:   Laterality: [] RIGHT [x] LEFT                  [] BILATERAL  Procedures:  [] Total Shoulder Arthroplasty (48372)   [x] Reverse Shoulder Arthroplasty (15093)   [x] Biceps Tenodesis (12129)    Additional info:   [x] PCP Clearance Needed  [x] MRSA  [] C-Arm  [x] TXA at time surgery  [x] Physical Therapy Internal - Monse Rendon  [x] DME Rx Needed  [x] Notify Greg García  [] Appt with Dr. Bales needed  Implants needed: Ricky  Positioning Equipment: Beach Chair Setup, [place]

## 2024-08-28 ENCOUNTER — TELEPHONE (OUTPATIENT)
Dept: ORTHOPEDICS CLINIC | Facility: CLINIC | Age: 74
End: 2024-08-28

## 2024-08-28 ENCOUNTER — TELEPHONE (OUTPATIENT)
Dept: FAMILY MEDICINE CLINIC | Facility: CLINIC | Age: 74
End: 2024-08-28

## 2024-08-28 NOTE — TELEPHONE ENCOUNTER
SPOKE WITH PATIENT AND WE SCHEDULED SURGERY AND POST OP    PATIENT WILL CALL BACK TO GO OVER PREOPERATIVE PROCEDURES

## 2024-08-28 NOTE — TELEPHONE ENCOUNTER
LM to call the office and schedule a appt for pre op clearance surgery schedule for 10/15/2024.    Paperwork is on pre op  back tickler.

## 2024-09-11 NOTE — TELEPHONE ENCOUNTER
LM to son that we have been trying to reach his mom (patient) to schedule an appointments for her pre op for her upcoming surgery that on 10/15/2024. Told son to call us to schedule an appointment

## 2024-09-16 RX ORDER — MELOXICAM 7.5 MG/1
7.5 TABLET ORAL DAILY
Qty: 90 TABLET | Refills: 0 | Status: SHIPPED | OUTPATIENT
Start: 2024-09-16

## 2024-09-16 NOTE — TELEPHONE ENCOUNTER
Refill request for:    Requested Prescriptions     Pending Prescriptions Disp Refills    MELOXICAM 7.5 MG Oral Tab [Pharmacy Med Name: MELOXICAM 7.5 MG TABLET] 90 tablet 0     Sig: TAKE 1 TABLET BY MOUTH EVERY DAY        Last Prescribed Quantity Refills   6/20/24 90 0     LOV 6/20/2024     Patient was asked to follow-up in: Follow-up not documented in note    Appointment scheduled: Visit date not found    Medication not on protocol.     # 90 with 0 refills routed to MARIAH Faria for review

## 2024-09-19 NOTE — TELEPHONE ENCOUNTER
PATIENT RESCHEDULED TO:    Date of Surgery: 1/14/2025       Post Op Appt: 1/21/2025 230     Case ID: [id number]     Notes:  NEW PCP CLEARANCE SENT

## 2024-09-20 ENCOUNTER — TELEPHONE (OUTPATIENT)
Dept: FAMILY MEDICINE CLINIC | Facility: CLINIC | Age: 74
End: 2024-09-20

## 2024-09-20 NOTE — TELEPHONE ENCOUNTER
Received paperwork from Orthopedic Surgery for surgery date on 1/14/24 with Dr. Bales, left reverse shoulder arthroplasty biceps tenodesis    Orders needed    H&P  Medical Clearance    Paperwork in back of pre op juana SOLOMON for pt to schedule

## 2024-09-23 NOTE — TELEPHONE ENCOUNTER
Received paperwork from Orthopedic Surgery for surgery date on 1/14/25 with Dr. Bales, left reverse shoulder arthroplasty biceps tenodesis     Orders needed     H&P  Medical Clearance     Paperwork in back of pre op juana SOLOMON for pt to schedule

## 2024-09-27 ENCOUNTER — TELEPHONE (OUTPATIENT)
Dept: FAMILY MEDICINE CLINIC | Facility: CLINIC | Age: 74
End: 2024-09-27

## 2024-09-27 NOTE — TELEPHONE ENCOUNTER
Pt is calling her surgery has been moved to January but she is not sure she wants the surgery and will call us back to let us know.

## 2024-10-04 NOTE — TELEPHONE ENCOUNTER
Called JER to come in to get her form.   
Called and LMOM again to come in to  her form   
Called and LMOM to call back her form is done and ready to be picked up.   
Forms completed back to triage.   
Left message to call back  
Patient picked up form yesterday  
Pt dropped off an accommodation questionnaire to be completed for [employer]    Call pt when completed     Placed in triage in forms bin  
Detail Level: Simple
Price (Do Not Change): 0.00
Instructions: This plan will send the code FBSE to the PM system.  DO NOT or CHANGE the price.

## 2024-10-08 ENCOUNTER — TELEPHONE (OUTPATIENT)
Dept: ORTHOPEDICS CLINIC | Facility: CLINIC | Age: 74
End: 2024-10-08

## 2024-10-16 ENCOUNTER — APPOINTMENT (OUTPATIENT)
Dept: PHYSICAL THERAPY | Age: 74
End: 2024-10-16
Attending: ORTHOPAEDIC SURGERY
Payer: MEDICARE

## 2024-10-23 ENCOUNTER — APPOINTMENT (OUTPATIENT)
Dept: PHYSICAL THERAPY | Age: 74
End: 2024-10-23
Attending: ORTHOPAEDIC SURGERY
Payer: MEDICARE

## 2024-10-25 ENCOUNTER — APPOINTMENT (OUTPATIENT)
Dept: PHYSICAL THERAPY | Age: 74
End: 2024-10-25
Attending: ORTHOPAEDIC SURGERY
Payer: MEDICARE

## 2024-10-29 ENCOUNTER — APPOINTMENT (OUTPATIENT)
Dept: PHYSICAL THERAPY | Age: 74
End: 2024-10-29
Attending: ORTHOPAEDIC SURGERY
Payer: MEDICARE

## 2024-11-01 ENCOUNTER — APPOINTMENT (OUTPATIENT)
Dept: PHYSICAL THERAPY | Age: 74
End: 2024-11-01
Attending: ORTHOPAEDIC SURGERY
Payer: MEDICARE

## 2024-11-05 ENCOUNTER — APPOINTMENT (OUTPATIENT)
Dept: PHYSICAL THERAPY | Age: 74
End: 2024-11-05
Attending: ORTHOPAEDIC SURGERY
Payer: MEDICARE

## 2024-11-08 ENCOUNTER — APPOINTMENT (OUTPATIENT)
Dept: PHYSICAL THERAPY | Age: 74
End: 2024-11-08
Attending: ORTHOPAEDIC SURGERY
Payer: MEDICARE

## 2024-11-12 ENCOUNTER — APPOINTMENT (OUTPATIENT)
Dept: PHYSICAL THERAPY | Age: 74
End: 2024-11-12
Attending: ORTHOPAEDIC SURGERY
Payer: MEDICARE

## 2024-11-15 ENCOUNTER — APPOINTMENT (OUTPATIENT)
Dept: PHYSICAL THERAPY | Age: 74
End: 2024-11-15
Attending: ORTHOPAEDIC SURGERY
Payer: MEDICARE

## 2024-11-19 ENCOUNTER — APPOINTMENT (OUTPATIENT)
Dept: PHYSICAL THERAPY | Age: 74
End: 2024-11-19
Attending: ORTHOPAEDIC SURGERY
Payer: MEDICARE

## 2024-11-22 ENCOUNTER — APPOINTMENT (OUTPATIENT)
Dept: PHYSICAL THERAPY | Age: 74
End: 2024-11-22
Attending: ORTHOPAEDIC SURGERY
Payer: MEDICARE

## 2024-11-26 ENCOUNTER — APPOINTMENT (OUTPATIENT)
Dept: PHYSICAL THERAPY | Age: 74
End: 2024-11-26
Attending: ORTHOPAEDIC SURGERY
Payer: MEDICARE

## 2024-11-26 DIAGNOSIS — R42 VERTIGO: ICD-10-CM

## 2024-11-27 RX ORDER — MECLIZINE HYDROCHLORIDE 25 MG/1
25 TABLET ORAL 2 TIMES DAILY
Qty: 180 TABLET | Refills: 1 | Status: SHIPPED | OUTPATIENT
Start: 2024-11-27

## 2024-11-27 NOTE — TELEPHONE ENCOUNTER
Refill request for:    Requested Prescriptions     Pending Prescriptions Disp Refills    MECLIZINE 25 MG Oral Tab [Pharmacy Med Name: MECLIZINE 25 MG TABLET] 180 tablet 1     Sig: TAKE 1 TABLET BY MOUTH TWICE A DAY        Last Prescribed Quantity Refills   6/20/24 180 1     LOV 6/20/2024     Patient was asked to follow-up in: Follow-up not documented in note    Appointment scheduled: Visit date not found    Medication not on protocol.     # 180 with 1 refills routed to MARIAH Faria for review

## 2024-11-29 ENCOUNTER — APPOINTMENT (OUTPATIENT)
Dept: PHYSICAL THERAPY | Age: 74
End: 2024-11-29
Attending: ORTHOPAEDIC SURGERY
Payer: MEDICARE

## 2024-12-03 ENCOUNTER — APPOINTMENT (OUTPATIENT)
Dept: PHYSICAL THERAPY | Age: 74
End: 2024-12-03
Attending: ORTHOPAEDIC SURGERY
Payer: MEDICARE

## 2024-12-06 ENCOUNTER — APPOINTMENT (OUTPATIENT)
Dept: PHYSICAL THERAPY | Age: 74
End: 2024-12-06
Attending: ORTHOPAEDIC SURGERY
Payer: MEDICARE

## 2024-12-10 ENCOUNTER — APPOINTMENT (OUTPATIENT)
Dept: PHYSICAL THERAPY | Age: 74
End: 2024-12-10
Attending: ORTHOPAEDIC SURGERY
Payer: MEDICARE

## 2024-12-13 ENCOUNTER — APPOINTMENT (OUTPATIENT)
Dept: PHYSICAL THERAPY | Age: 74
End: 2024-12-13
Attending: ORTHOPAEDIC SURGERY
Payer: MEDICARE

## 2024-12-14 RX ORDER — MELOXICAM 7.5 MG/1
7.5 TABLET ORAL DAILY
Qty: 90 TABLET | Refills: 0 | Status: SHIPPED | OUTPATIENT
Start: 2024-12-14

## 2024-12-14 NOTE — TELEPHONE ENCOUNTER
Requested Prescriptions     Signed Prescriptions Disp Refills    MELOXICAM 7.5 MG Oral Tab 90 tablet 0     Sig: TAKE 1 TABLET BY MOUTH EVERY DAY     Authorizing Provider: BAM KAHN     Ordering User: CLEVE WINN      Refilled per protocol/OV notes

## 2024-12-17 ENCOUNTER — APPOINTMENT (OUTPATIENT)
Dept: PHYSICAL THERAPY | Age: 74
End: 2024-12-17
Attending: ORTHOPAEDIC SURGERY
Payer: MEDICARE

## 2024-12-20 ENCOUNTER — APPOINTMENT (OUTPATIENT)
Dept: PHYSICAL THERAPY | Age: 74
End: 2024-12-20
Attending: ORTHOPAEDIC SURGERY
Payer: MEDICARE

## 2024-12-24 ENCOUNTER — APPOINTMENT (OUTPATIENT)
Dept: PHYSICAL THERAPY | Age: 74
End: 2024-12-24
Attending: ORTHOPAEDIC SURGERY
Payer: MEDICARE

## 2024-12-26 ENCOUNTER — APPOINTMENT (OUTPATIENT)
Dept: PHYSICAL THERAPY | Age: 74
End: 2024-12-26
Attending: ORTHOPAEDIC SURGERY
Payer: MEDICARE

## 2024-12-31 ENCOUNTER — APPOINTMENT (OUTPATIENT)
Dept: PHYSICAL THERAPY | Age: 74
End: 2024-12-31
Attending: ORTHOPAEDIC SURGERY
Payer: MEDICARE

## 2025-01-02 ENCOUNTER — APPOINTMENT (OUTPATIENT)
Dept: PHYSICAL THERAPY | Age: 75
End: 2025-01-02
Attending: ORTHOPAEDIC SURGERY
Payer: MEDICARE

## 2025-01-07 ENCOUNTER — APPOINTMENT (OUTPATIENT)
Dept: PHYSICAL THERAPY | Age: 75
End: 2025-01-07
Attending: ORTHOPAEDIC SURGERY
Payer: MEDICARE

## 2025-01-10 ENCOUNTER — APPOINTMENT (OUTPATIENT)
Dept: PHYSICAL THERAPY | Age: 75
End: 2025-01-10
Attending: ORTHOPAEDIC SURGERY
Payer: MEDICARE

## 2025-01-14 ENCOUNTER — APPOINTMENT (OUTPATIENT)
Dept: PHYSICAL THERAPY | Age: 75
End: 2025-01-14
Attending: ORTHOPAEDIC SURGERY
Payer: MEDICARE

## 2025-01-17 ENCOUNTER — APPOINTMENT (OUTPATIENT)
Dept: PHYSICAL THERAPY | Age: 75
End: 2025-01-17
Attending: ORTHOPAEDIC SURGERY
Payer: MEDICARE

## 2025-01-21 ENCOUNTER — APPOINTMENT (OUTPATIENT)
Dept: PHYSICAL THERAPY | Age: 75
End: 2025-01-21
Attending: ORTHOPAEDIC SURGERY
Payer: MEDICARE

## 2025-01-24 ENCOUNTER — APPOINTMENT (OUTPATIENT)
Dept: PHYSICAL THERAPY | Age: 75
End: 2025-01-24
Attending: ORTHOPAEDIC SURGERY
Payer: MEDICARE

## 2025-02-09 DIAGNOSIS — I10 ESSENTIAL HYPERTENSION: ICD-10-CM

## 2025-02-10 RX ORDER — LISINOPRIL AND HYDROCHLOROTHIAZIDE 12.5; 2 MG/1; MG/1
1 TABLET ORAL DAILY
Qty: 90 TABLET | Refills: 1 | Status: SHIPPED | OUTPATIENT
Start: 2025-02-10

## 2025-02-10 NOTE — TELEPHONE ENCOUNTER
Requested Prescriptions     Signed Prescriptions Disp Refills    LISINOPRIL-HYDROCHLOROTHIAZIDE 20-12.5 MG Oral Tab 90 tablet 1     Sig: TAKE 1 TABLET BY MOUTH EVERY DAY     Authorizing Provider: BAM KAHN     Ordering User: CLEVE WINN      Refilled per protocol/OV notes

## 2025-03-12 DIAGNOSIS — E78.00 PURE HYPERCHOLESTEROLEMIA: ICD-10-CM

## 2025-03-13 RX ORDER — ATORVASTATIN CALCIUM 20 MG/1
20 TABLET, FILM COATED ORAL NIGHTLY
Qty: 90 TABLET | Refills: 1 | Status: SHIPPED | OUTPATIENT
Start: 2025-03-13

## 2025-03-13 RX ORDER — MELOXICAM 7.5 MG/1
7.5 TABLET ORAL DAILY
Qty: 90 TABLET | Refills: 0 | Status: SHIPPED | OUTPATIENT
Start: 2025-03-13

## 2025-03-13 NOTE — TELEPHONE ENCOUNTER
Failed protocol    Requested Prescriptions     Pending Prescriptions Disp Refills    MELOXICAM 7.5 MG Oral Tab [Pharmacy Med Name: MELOXICAM 7.5 MG TABLET] 90 tablet 0     Sig: TAKE 1 TABLET BY MOUTH EVERY DAY     Signed Prescriptions Disp Refills    ATORVASTATIN 20 MG Oral Tab 90 tablet 1     Sig: TAKE 1 TABLET BY MOUTH EVERY DAY AT NIGHT     Authorizing Provider: BAM KAHN     Ordering User: CLEVE WINN        Last refill: 12/14/24 90 tabs 0 refills    Last Appointment: LOV 6/20/2024     Next Appointment: Visit date not found

## 2025-03-20 ENCOUNTER — TELEPHONE (OUTPATIENT)
Dept: ORTHOPEDICS CLINIC | Facility: CLINIC | Age: 75
End: 2025-03-20

## 2025-03-20 NOTE — TELEPHONE ENCOUNTER
Patient came to the [facility] location requesting to return the DME sling from the surgery on 1/14/2025 that was cancelled. I notified patient that unfortunately since the sling was given to her so long ago she may not be able to return it but with confirm with Danae.

## 2025-05-29 ENCOUNTER — TELEPHONE (OUTPATIENT)
Dept: FAMILY MEDICINE CLINIC | Facility: CLINIC | Age: 75
End: 2025-05-29

## 2025-05-29 DIAGNOSIS — E78.00 PURE HYPERCHOLESTEROLEMIA: Primary | ICD-10-CM

## 2025-05-29 DIAGNOSIS — I10 ESSENTIAL HYPERTENSION: ICD-10-CM

## 2025-05-29 NOTE — TELEPHONE ENCOUNTER
Please enter lab orders for the patient's upcoming physical appointment.     Physical scheduled:   Your appointments       Date & Time Appointment Department ([place])    Jun 19, 2025 1:15 PM CDT MA Supervisit with Mushtaq Knowles NP [facility] ([facility])              [facility]  [address] [place] [address] [place] [id number]  [phone]           Preferred lab: [facility] ([facility])     The patient has been notified to complete fasting labs prior to their physical appointment.

## 2025-06-08 DIAGNOSIS — R42 VERTIGO: ICD-10-CM

## 2025-06-09 RX ORDER — MELOXICAM 7.5 MG/1
7.5 TABLET ORAL DAILY
Qty: 90 TABLET | Refills: 0 | Status: SHIPPED | OUTPATIENT
Start: 2025-06-09

## 2025-06-09 RX ORDER — MECLIZINE HYDROCHLORIDE 25 MG/1
25 TABLET ORAL 2 TIMES DAILY
Qty: 180 TABLET | Refills: 1 | Status: SHIPPED | OUTPATIENT
Start: 2025-06-09

## 2025-06-09 NOTE — TELEPHONE ENCOUNTER
Requested Prescriptions     Pending Prescriptions Disp Refills    MECLIZINE 25 MG Oral Tab [Pharmacy Med Name: MECLIZINE 25 MG TABLET] 180 tablet 1     Sig: TAKE 1 TABLET BY MOUTH TWICE A DAY    MELOXICAM 7.5 MG Oral Tab [Pharmacy Med Name: MELOXICAM 7.5 MG TABLET] 90 tablet 0     Sig: TAKE 1 TABLET BY MOUTH EVERY DAY      Refilled per protocol/OV notes

## 2025-07-03 ENCOUNTER — OFFICE VISIT (OUTPATIENT)
Dept: FAMILY MEDICINE CLINIC | Facility: CLINIC | Age: 75
End: 2025-07-03
Payer: MEDICARE

## 2025-07-03 ENCOUNTER — LAB ENCOUNTER (OUTPATIENT)
Dept: LAB | Facility: HOSPITAL | Age: 75
End: 2025-07-03
Attending: NURSE PRACTITIONER
Payer: MEDICARE

## 2025-07-03 VITALS
HEART RATE: 89 BPM | DIASTOLIC BLOOD PRESSURE: 66 MMHG | BODY MASS INDEX: 30.66 KG/M2 | RESPIRATION RATE: 18 BRPM | OXYGEN SATURATION: 95 % | WEIGHT: 184 LBS | SYSTOLIC BLOOD PRESSURE: 120 MMHG | HEIGHT: 65 IN

## 2025-07-03 DIAGNOSIS — M25.512 CHRONIC PAIN OF BOTH SHOULDERS: ICD-10-CM

## 2025-07-03 DIAGNOSIS — M25.511 CHRONIC PAIN OF BOTH SHOULDERS: ICD-10-CM

## 2025-07-03 DIAGNOSIS — E78.00 PURE HYPERCHOLESTEROLEMIA: ICD-10-CM

## 2025-07-03 DIAGNOSIS — R42 VERTIGO: ICD-10-CM

## 2025-07-03 DIAGNOSIS — G89.29 CHRONIC PAIN OF BOTH SHOULDERS: ICD-10-CM

## 2025-07-03 DIAGNOSIS — I10 ESSENTIAL HYPERTENSION: ICD-10-CM

## 2025-07-03 DIAGNOSIS — Z00.00 ENCOUNTER FOR ANNUAL HEALTH EXAMINATION: Primary | ICD-10-CM

## 2025-07-03 PROBLEM — M54.50 CHRONIC BILATERAL LOW BACK PAIN WITHOUT SCIATICA: Status: RESOLVED | Noted: 2017-04-22 | Resolved: 2025-07-03

## 2025-07-03 LAB
ALBUMIN SERPL-MCNC: 4.5 G/DL (ref 3.2–4.8)
ALBUMIN/GLOB SERPL: 1.9 {RATIO} (ref 1–2)
ALP LIVER SERPL-CCNC: 98 U/L (ref 55–142)
ALT SERPL-CCNC: 16 U/L (ref 10–49)
ANION GAP SERPL CALC-SCNC: 7 MMOL/L (ref 0–18)
AST SERPL-CCNC: 22 U/L (ref ?–34)
BASOPHILS # BLD AUTO: 0.13 X10(3) UL (ref 0–0.2)
BASOPHILS NFR BLD AUTO: 1.7 %
BILIRUB SERPL-MCNC: 0.5 MG/DL (ref 0.2–1.1)
BUN BLD-MCNC: 17 MG/DL (ref 9–23)
CALCIUM BLD-MCNC: 9.7 MG/DL (ref 8.7–10.6)
CHLORIDE SERPL-SCNC: 104 MMOL/L (ref 98–112)
CHOLEST SERPL-MCNC: 165 MG/DL (ref ?–200)
CO2 SERPL-SCNC: 30 MMOL/L (ref 21–32)
CREAT BLD-MCNC: 0.8 MG/DL (ref 0.55–1.02)
EGFRCR SERPLBLD CKD-EPI 2021: 76 ML/MIN/1.73M2 (ref 60–?)
EOSINOPHIL # BLD AUTO: 0.31 X10(3) UL (ref 0–0.7)
EOSINOPHIL NFR BLD AUTO: 4.1 %
ERYTHROCYTE [DISTWIDTH] IN BLOOD BY AUTOMATED COUNT: 12.1 %
FASTING PATIENT LIPID ANSWER: YES
FASTING STATUS PATIENT QL REPORTED: YES
GLOBULIN PLAS-MCNC: 2.4 G/DL (ref 2–3.5)
GLUCOSE BLD-MCNC: 90 MG/DL (ref 70–99)
HCT VFR BLD AUTO: 42.3 % (ref 35–48)
HDLC SERPL-MCNC: 61 MG/DL (ref 40–59)
HGB BLD-MCNC: 14.1 G/DL (ref 12–16)
IMM GRANULOCYTES # BLD AUTO: 0.02 X10(3) UL (ref 0–1)
IMM GRANULOCYTES NFR BLD: 0.3 %
LDLC SERPL CALC-MCNC: 84 MG/DL (ref ?–100)
LYMPHOCYTES # BLD AUTO: 1.67 X10(3) UL (ref 1–4)
LYMPHOCYTES NFR BLD AUTO: 22.2 %
MCH RBC QN AUTO: 34.1 PG (ref 26–34)
MCHC RBC AUTO-ENTMCNC: 33.3 G/DL (ref 31–37)
MCV RBC AUTO: 102.2 FL (ref 80–100)
MONOCYTES # BLD AUTO: 0.74 X10(3) UL (ref 0.1–1)
MONOCYTES NFR BLD AUTO: 9.9 %
NEUTROPHILS # BLD AUTO: 4.64 X10 (3) UL (ref 1.5–7.7)
NEUTROPHILS # BLD AUTO: 4.64 X10(3) UL (ref 1.5–7.7)
NEUTROPHILS NFR BLD AUTO: 61.8 %
NONHDLC SERPL-MCNC: 104 MG/DL (ref ?–130)
OSMOLALITY SERPL CALC.SUM OF ELEC: 293 MOSM/KG (ref 275–295)
PLATELET # BLD AUTO: 334 10(3)UL (ref 150–450)
POTASSIUM SERPL-SCNC: 4.8 MMOL/L (ref 3.5–5.1)
PROT SERPL-MCNC: 6.9 G/DL (ref 5.7–8.2)
RBC # BLD AUTO: 4.14 X10(6)UL (ref 3.8–5.3)
SODIUM SERPL-SCNC: 141 MMOL/L (ref 136–145)
TRIGL SERPL-MCNC: 113 MG/DL (ref 30–149)
TSI SER-ACNC: 0.61 UIU/ML (ref 0.55–4.78)
VLDLC SERPL CALC-MCNC: 18 MG/DL (ref 0–30)
WBC # BLD AUTO: 7.5 X10(3) UL (ref 4–11)

## 2025-07-03 PROCEDURE — 84443 ASSAY THYROID STIM HORMONE: CPT

## 2025-07-03 PROCEDURE — 85025 COMPLETE CBC W/AUTO DIFF WBC: CPT

## 2025-07-03 PROCEDURE — 80061 LIPID PANEL: CPT

## 2025-07-03 PROCEDURE — 80053 COMPREHEN METABOLIC PANEL: CPT

## 2025-07-03 PROCEDURE — 36415 COLL VENOUS BLD VENIPUNCTURE: CPT

## 2025-07-03 RX ORDER — MELOXICAM 7.5 MG/1
7.5 TABLET ORAL 2 TIMES DAILY
Qty: 60 TABLET | Refills: 0 | Status: SHIPPED | OUTPATIENT
Start: 2025-07-03 | End: 2025-08-02

## 2025-07-03 NOTE — PROGRESS NOTES
Subjective:   Becky Alcaraz is a 74 year old female who presents for a MA AHA (Medicare Advantage Annual Health Assessment) and Subsequent Annual Wellness visit (Pt already had Initial Annual Wellness) and scheduled follow up of multiple significant but stable problems.             Continues working at [employer] full time.       Has continued complaints of pain to bilateral shoulders effecting movement that has been ongoing issues since early 2020. Reports pain is constant and about equal in both shoulders. Reports limited movement od shoulders, unable to elevate arms either to her sides or in front of her even to level of shoulder. Denies known injury to shoulders. Denies redness or swelling of shoulders. Has tried home exercises, is unable to afford physical therapy visits. Is unable to tolerate steroids due to possible allergy. Has seen Dr. Bales for this, diagnosis of glenohumeral arthritis. Was planning for surgery for reverse shoulder arthroplasty and biceps tenodesis in January 2025 but this surgery was cancelled as she could not afford to miss so much time from work. Has been managing with meloxicam with limited relief. Also taking tumeric and black pepper supplement and glucosamine with a little relief.       HTN/dyslipidemia- Stated taking medications as ordered, w/o reported side effects. Is not checking BP at home. Denies chest pain, palpitations, SOB, TIRADO, headaches, visual changes, muscle aches, joint pain, abd bloating, cramping.     Vertigo- chronica in presentation, has been a little worse in the past few days. Typically manages with meclizine with good control. Reports episodes occur approx 2-3 times per day, reports is taking meclizine BID and feels this is working well.       History/Other:   Fall Risk Assessment:   She has been screened for Falls and is High Risk. Fall Prevention information provided to patient in After Visit Summary.    Do you feel unsteady when standing or  walking?: Yes  Do you worry about falling?: Yes  Have you fallen in the past year?: No     Cognitive Assessment:   She had a completely normal cognitive assessment - see flowsheet entries       Functional Ability/Status:   Becky Alcaraz has some abnormal functions as listed below:  She has Dressing and/or Bathing issues based on screening of functional status.  Difficulty dressing or bathing?: Yes  Bathing or Showering: Able without help  Dressing: Need some help  She has Walking problems based on screening of functional status.       Depression Screening (PHQ):  PHQ-2 SCORE: 0  , done 7/3/2025   Trouble falling or staying asleep, or sleeping too much: 1     Feeling tired or having little energy: 1    If you checked off any problems, how difficult have these problems made it for you to do your work, take care of things at home, or get along with other people?: Not difficult at all              Advanced Directives:   She does NOT have a Living Will. [Do you have a living will?: No]  She does NOT have a Power of  for Health Care. [Do you have a healthcare power of ?: No]  Discussed Advance Care Planning with patient (and family/surrogate if present). Standard forms made available to patient in After Visit Summary.      Patient Active Problem List   Diagnosis    Essential hypertension    Vertigo    Pure hypercholesterolemia     Allergies:  She is allergic to cortisone [cortisone acetate].    Current Medications:  Outpatient Medications Marked as Taking for the 7/3/25 encounter (Office Visit) with Mushtaq Knowles NP   Medication Sig    Meloxicam 7.5 MG Oral Tab Take 1 tablet (7.5 mg total) by mouth in the morning and 1 tablet (7.5 mg total) before bedtime.    MECLIZINE 25 MG Oral Tab TAKE 1 TABLET BY MOUTH TWICE A DAY    MELOXICAM 7.5 MG Oral Tab TAKE 1 TABLET BY MOUTH EVERY DAY    ATORVASTATIN 20 MG Oral Tab TAKE 1 TABLET BY MOUTH EVERY DAY AT NIGHT    LISINOPRIL-HYDROCHLOROTHIAZIDE 20-12.5 MG Oral Tab  TAKE 1 TABLET BY MOUTH EVERY DAY    Multiple Vitamins-Minerals (CENTRUM SILVER ULTRA WOMENS) Oral Tab Take 1 tablet by mouth daily.    vitamin A 94067 UNITS Oral Cap Take 1 capsule (10,000 Units total) by mouth daily.    Calcium Carbonate-Vitamin D 600-400 MG-UNIT Oral Tab Take 1 tablet by mouth daily.    Misc Natural Products (OSTEO BI-FLEX TRIPLE STRENGTH) Oral Tab Take 1 tablet by mouth daily.    ascorbic acid, VITAMIN C, 250 MG Oral Tab Take 1 tablet (250 mg total) by mouth daily.    Biotin 5000 MCG Oral Cap Take 1 tablet by mouth daily.    Cyanocobalamin (VITAMIN B 12 OR) Take 100 mcg by mouth daily.       Medical History:  She  has a past medical history of Allergic rhinitis ([employer]), Arthritis (2 or 3 years ago), BACK PAIN, Essential hypertension (Several years now), GERD, HEADACHES, HYPERLIPIDEMIA, Hyperlipidemia (Few years ago), HYPERTENSION, MITRAL VALVE PROLAPSE, Olecranon bursitis of left elbow (10/11/2018), OTHER DISEASES, Stress fracture of left foot with routine healing, subsequent encounter (2017), and Stress reaction of left foot, initial encounter (2017).  Surgical History:  She  has a past surgical history that includes spine surgery procedure unlisted;  (1978); laparoscopic cholecystectomy; d & c (, 1982); hemorrhoidectomy (1992); hernia surgery (1997); unlisted proc, arthroscopy (1996); laminectomy,lumbar; spine surgery procedure unlisted; other surgical history; other surgical history; spine surgery procedure unlisted; forearm/wrist surgery unlisted; appendectomy (92); back surgery (93 and 3/24/00); cholecystectomy (91); hysterectomy (10/18/82); and  (70).   Family History:  Her family history includes Asthma in her son; Cancer in her brother, daughter, and father; Diabetes in her mother; Hypertension in her mother; acute MI in her father; hodgkin's disease in her father.  Social History:  She  reports that she  has never smoked. She has never used smokeless tobacco. She reports current alcohol use. She reports that she does not use drugs.    Tobacco:  She has never smoked tobacco.    CAGE Alcohol Screen:   CAGE screening score of 0 on 7/3/2025, showing low risk of alcohol abuse.      Patient Care Team:  Zoila Balderas MD as PCP - General (Family Practice)  Niki Chaves PT as Physical Therapist (Physical Therapy)  Bear Villalba    Review of Systems  GENERAL: feels well otherwise  SKIN: denies any unusual skin lesions  EYES: denies blurred vision or double vision  HEENT: denies nasal congestion, sinus pain or ST  LUNGS: denies shortness of breath with exertion  CARDIOVASCULAR: denies chest pain on exertion  GI: denies abdominal pain, denies heartburn  : denies dysuria, vaginal discharge or itching, no complaint of urinary incontinence   NEURO: denies headaches    Objective:   Physical Exam  General Appearance:  Alert, cooperative, no distress, appears stated age   Head:  Normocephalic, without obvious abnormality, atraumatic   Eyes:  PERRL, conjunctiva/corneas clear, EOM's intact both eyes   Ears:  Normal TM's and external ear canals, both ears   Nose: Nares normal, septum midline,mucosa normal, no drainage or sinus tenderness   Throat: Lips, mucosa, and tongue normal; teeth and gums normal   Neck: Supple, symmetrical, trachea midline, no adenopathy;  thyroid: not enlarged, symmetric, no tenderness/mass/nodules; no carotid bruit or JVD   Back:   Symmetric, no curvature, ROM normal, no CVA tenderness   Lungs:   Clear to auscultation bilaterally, respirations unlabored   Heart:  Regular rate and rhythm, S1 and S2 normal, no murmur, rub, or gallop   Abdomen:   Soft, non-tender, bowel sounds active all four quadrants,  no masses, no organomegaly   Pelvic: Deferred   Extremities: Extremities normal, atraumatic, no cyanosis or edema   Pulses: 2+ and symmetric   Skin: Skin color, texture, turgor normal, no rashes or lesions    Lymph nodes: Cervical, supraclavicular, and axillary nodes normal   Neurologic: Normal       /66   Pulse 89   Resp 18   Ht 5' 5\" (1.651 m)   Wt 184 lb (83.5 kg)   SpO2 95%   BMI 30.62 kg/m²  Estimated body mass index is 30.62 kg/m² as calculated from the following:    Height as of this encounter: 5' 5\" (1.651 m).    Weight as of this encounter: 184 lb (83.5 kg).    Medicare Hearing Assessment:   Hearing Screening    Screening Method: Finger Rub  Finger Rub Result: Pass         Visual Acuity:   Right Eye Visual Acuity: Corrected Right Eye Chart Acuity: 20/30   Left Eye Visual Acuity: Corrected Left Eye Chart Acuity: 20/30   Both Eyes Visual Acuity: Corrected Both Eyes Chart Acuity: 20/30   Able To Tolerate Visual Acuity: Yes        Assessment & Plan:   Becky Alcaraz is a 74 year old female who presents for a Medicare Assessment.     1. Encounter for annual health examination (Primary)- doing overall well. Encouraged to remain active and eat a healthy diet. Check labs as previously ordered.     2. Chronic pain of both shoulders- increase meloxicam to BID for 30 days. Instructed cannot take this dose longer than that and needs to take all doses with food. Then reduce back to only once daily. Follow up with Dr. Bales for alternative management options. Verbalized understanding and agreeable to this plan of care.   -     ORTHOPEDIC - INTERNAL  3. Essential hypertension- stable, continue current management.   4. Pure hypercholesterolemia- check labs, continue current management.   5. Vertigo- check labs. Continue Meclizine BID. Notify office if worsening or new symptoms. Verbalized understanding and agreeable to this plan of care.  Other orders  -     Meloxicam; Take 1 tablet (7.5 mg total) by mouth in the morning and 1 tablet (7.5 mg total) before bedtime.  Dispense: 60 tablet; Refill: 0            The patient indicates understanding of these issues and agrees to the plan.  Reinforced healthy diet,  lifestyle, and exercise.      No follow-ups on file.     Mushtaq Knowles NP, 7/3/2025     Supplementary Documentation:   General Health:  In the past six months, have you lost more than 10 pounds without trying?: 2 - No  Has your appetite been poor?: No  Type of Diet: Balanced, Low Salt  How does the patient maintain a good energy level?: Stretching  How would you describe your daily physical activity?: Heavy  How would you describe your current health state?: Fair  How do you maintain positive mental well-being?: Social Interaction, Puzzles, Games, Visiting Friends  On a scale of 0 to 10, with 0 being no pain and 10 being severe pain, what is your pain level?: 10 - (Severe)  In the past six months, have you experienced urine leakage?: 0-No  At any time do you feel concerned for the safety/well-being of yourself and/or your children, in your home or elsewhere?: No  Have you had any immunizations at another office such as Influenza, Hepatitis B, Tetanus, or Pneumococcal?: No    Health Maintenance   Topic Date Due    Zoster Vaccines (1 of 2) Never done    COVID-19 Vaccine (1 - 2024-25 season) Never done    Annual Well Visit  01/01/2025    Annual Depression Screening  01/01/2025    Fall Risk Screening (Annual)  01/01/2025    Influenza Vaccine (1) 10/01/2025    DEXA Scan  Completed    Pneumococcal Vaccine: 50+ Years  Completed    Meningococcal B Vaccine  Aged Out    Mammogram  Discontinued

## 2025-07-24 ENCOUNTER — TELEPHONE (OUTPATIENT)
Dept: FAMILY MEDICINE CLINIC | Facility: CLINIC | Age: 75
End: 2025-07-24

## 2025-07-24 DIAGNOSIS — Z01.00 ENCOUNTER FOR COMPLETE EYE EXAM: Primary | ICD-10-CM

## 2025-07-24 NOTE — TELEPHONE ENCOUNTER
Patient calling needing referral to see an opthalmology just for an annual exam     Dr. Henley or Dr. Lee    At [address]

## 2025-07-24 NOTE — TELEPHONE ENCOUNTER
Referral request Dr. Davion Lee M.D.  [address] [facility]    [place]     Eye exam    6 visits

## 2025-08-20 DIAGNOSIS — I10 ESSENTIAL HYPERTENSION: ICD-10-CM

## 2025-08-20 RX ORDER — MELOXICAM 7.5 MG/1
7.5 TABLET ORAL DAILY
Qty: 90 TABLET | Refills: 0 | Status: SHIPPED | OUTPATIENT
Start: 2025-08-20

## 2025-08-22 RX ORDER — LISINOPRIL AND HYDROCHLOROTHIAZIDE 12.5; 2 MG/1; MG/1
1 TABLET ORAL DAILY
Qty: 90 TABLET | Refills: 3 | Status: SHIPPED | OUTPATIENT
Start: 2025-08-22

## (undated) DIAGNOSIS — I10 ESSENTIAL HYPERTENSION: ICD-10-CM

## (undated) DIAGNOSIS — E78.00 PURE HYPERCHOLESTEROLEMIA: ICD-10-CM

## (undated) NOTE — LETTER
24      Orthopedic Surgery   Pre-Operative Clearance Request    Patient Name:   Becky Alcaraz             :   1950    Surgeon: Dr. Bales             Date of Surgery: 2025    Surgical Procedure: Left Reverse Shoulder Arthroplasty Biceps Tenodesis       MUST COMPLETE ALL OF THE FOLLOWING 2-3 WEEKS PRIOR TO YOUR SURGERY TO AVOID CANCELLATION, DUE TO THE RULE THEIR WILL BE NO EXCEPTIONS!      [x]  History and Physical      [x]  Medical  Clearance                           **Please fax test results, H&P, and clearance to [phone] and to P.A.T at [phone]**

## (undated) NOTE — LETTER
July 7, 2017    Patient: Min Willis   Date of Visit: 7/7/2017       To Whom It May Concern:    Kaur Linda was seen and treated in our emergency department on 7/7/2017. She should not return to work until 7/11/17.     If you have any questions or

## (undated) NOTE — LETTER
Date: 7/10/2017    Patient Name: Anthony Harrison          To Whom it may concern: This letter has been written at the patient's request. The above patient was seen at the [facility] for treatment of a medical condition.     This patient rahul

## (undated) NOTE — MR AVS SNAPSHOT
[address] [facility] [address]  [facility],  [address]  [address] [place] [address] [phone]               Thank you for choosing us for your health care visit with Saloni Merlos MD.  We are glad to serve you and happy to provide you with this cadena ascorbic acid (VITAMIN C) 250 MG Tabs   Take 250 mg by mouth daily. Biotin 5000 MCG Caps   Take 1 tablet by mouth daily. Calcium Carbonate-Vitamin D 600-400 MG-UNIT Tabs   Take 1 tablet by mouth daily.            * CENTRUM SILVER ULTRA Expires: 6/19/2017 12:18 PM    If you have questions, you can call [phone] to talk to our [facility] Staff. Remember, [facility] is NOT to be used for urgent needs. For medical emergencies, dial 911.            Visit [employer]

## (undated) NOTE — LETTER
Date & Time: 9/18/2018, 11:31 AM  Patient: Owen Solid  Encounter Provider(s):    Jill Morin MD       To Whom It May Concern:    Martha Goodman was seen and treated in our department on 9/18/2018.  She no duty with the left arm for the next 3-5 day

## (undated) NOTE — MR AVS SNAPSHOT
[address] [facility] [address]  [facility],  [address] [facility] [phone]               Thank you for choosing us for your health care visit with Bryn Marquez MD.  We are glad to serve you and happy to provide you with this cadena vitamin A 77637 UNITS Caps   Take 10,000 Units by mouth daily. Commonly known as:  AQUASOL A           VITAMIN B 12 OR   Take 100 mcg by mouth daily. * Notice:   This list has 2 medication(s) that are the same as other medications prescribed fo

## (undated) NOTE — MR AVS SNAPSHOT
[address] [facility] [address]  [facility],  [address] [facility] [phone]               Thank you for choosing us for your health care visit with Skinny Hunter MD.  We are glad to serve you and happy to provide you with this cadena Lipid Panel    Complete by:  Apr 20, 2017 (Approximate)    Assoc Dx:  Laboratory exam ordered as part of routine general medical examination [Z00.00], Screening for lipoid disorders [Z13.220]           TSH and Free T4 [E]    Complete by:  Apr 20, 2017 (Ap clinic staff will provide you with the phone number you should call to schedule your appointment.      If you are confident that your benefit plan will not require a referral or authorization, such as [place], please feel free to schedule your irma Laboratory exam ordered as part of routine general medical examination        Screening for lipoid disorders        Screening for thyroid disorder          Instructions and Information about [address]   Tests on meet one of the following criteria:   • Men who are 73-68 years old and have smoked more than 100 cigarettes in their lifetime   • Anyone with a family history    Colorectal Cancer Screening  Covered up to Age 76     Colonoscopy Screen   Covered every 10 y Covered Annually No orders found for this or any previous visit. Please get every year    Pneumococcal 13 (Prevnar)  Covered Once after 65 No orders found for this or any previous visit.  Please get once after your 65th birthday    Pneumococcal 23 (Pneumova Today's Vital Signs     BP Pulse Temp Height Weight BMI    140/88 mmHg 80 98.1 °F (36.7 °C) (Oral) 64.8\" 189 lb 6.4 oz 31.71 kg/m2         Current Medications          This list is accurate as of: 4/20/17 12:18 PM.  Always use your most recen If you have questions, you can call [phone] to talk to our [facility] Staff. Remember, [employer] is NOT to be used for urgent needs. For medical emergencies, dial 911.         Educational Information     Healthy Diet and Regular Exercise  [facility]

## (undated) NOTE — LETTER
24      Orthopedic Surgery   Pre-Operative Clearance Request    Patient Name:   Becky Alcaraz             :   1950    Surgeon: Dr. Bales             Date of Surgery: 10/15/2024    Surgical Procedure: Left Reverse Shoulder Arthroplasty Biceps Tenodesis       MUST COMPLETE ALL OF THE FOLLOWING 2-3 WEEKS PRIOR TO YOUR SURGERY TO AVOID CANCELLATION, DUE TO THE RULE THEIR WILL BE NO EXCEPTIONS!      [x]  History and Physical      [x]  Medical  Clearance                                    **Please fax test results, H&P, and clearance to [phone] and to P.A.T at [phone]**

## (undated) NOTE — ED AVS SNAPSHOT
Ms. Lainey Santos   MRN: [id number]    Department:  [facility] Emergency Department   Date of Visit:  9/18/2018           Disclosure     Insurance plans vary and the physician(s) referred by the ER may not be covered by your plan.  Please contact tell this physician (or your personal doctor if your instructions are to return to your personal doctor) about any new or lasting problems. The primary care or specialist physician will see patients referred from the [facility] Emergency Department.  Gely Galvan

## (undated) NOTE — ED AVS SNAPSHOT
[facility] Emergency Department  [place] [address] [address]  Phone:  [phone]  Fax:  [phone]          Ms. Tree Sagastume   MRN: [id number]    Department:  [facility] Emergency Department   Date of Visit:  7/7/2017 IF THERE IS ANY CHANGE OR WORSENING OF YOUR CONDITION, CALL YOUR PRIMARY CARE PHYSICIAN AT ONCE OR RETURN IMMEDIATELY TO THE EMERGENCY DEPARTMENT.     If you have been prescribed any medication(s), please fill your prescription right away and begin taking t